# Patient Record
Sex: FEMALE | Employment: FULL TIME | URBAN - METROPOLITAN AREA
[De-identification: names, ages, dates, MRNs, and addresses within clinical notes are randomized per-mention and may not be internally consistent; named-entity substitution may affect disease eponyms.]

---

## 2017-02-14 ENCOUNTER — MED REC SCAN ONLY (OUTPATIENT)
Dept: INTERNAL MEDICINE CLINIC | Facility: CLINIC | Age: 57
End: 2017-02-14

## 2017-11-06 RX ORDER — DEXLANSOPRAZOLE 30 MG/1
30 CAPSULE, DELAYED RELEASE ORAL DAILY
Qty: 30 CAPSULE | Refills: 6 | Status: SHIPPED | OUTPATIENT
Start: 2017-11-06 | End: 2019-10-28

## 2019-09-30 ENCOUNTER — HOSPITAL (OUTPATIENT)
Dept: OTHER | Age: 59
End: 2019-09-30
Attending: INTERNAL MEDICINE

## 2019-10-11 ENCOUNTER — TELEPHONE (OUTPATIENT)
Dept: INTERNAL MEDICINE CLINIC | Facility: CLINIC | Age: 59
End: 2019-10-11

## 2019-10-11 NOTE — TELEPHONE ENCOUNTER
Patient called to see if Dr. Ana Diego received a letter from the [address] at [facility]?     Patient needs to have a diagnostic ultrasound of her right breast.     She is wondering if Dr. Ana Diego needs to see her before the test can be s

## 2019-10-28 ENCOUNTER — OFFICE VISIT (OUTPATIENT)
Dept: INTERNAL MEDICINE CLINIC | Facility: CLINIC | Age: 59
End: 2019-10-28
Payer: COMMERCIAL

## 2019-10-28 VITALS
WEIGHT: 174.38 LBS | DIASTOLIC BLOOD PRESSURE: 78 MMHG | OXYGEN SATURATION: 98 % | HEIGHT: 62 IN | HEART RATE: 87 BPM | SYSTOLIC BLOOD PRESSURE: 128 MMHG | BODY MASS INDEX: 32.09 KG/M2

## 2019-10-28 DIAGNOSIS — N60.12 FIBROCYSTIC BREAST CHANGES OF BOTH BREASTS: Primary | ICD-10-CM

## 2019-10-28 DIAGNOSIS — N60.11 FIBROCYSTIC BREAST CHANGES OF BOTH BREASTS: Primary | ICD-10-CM

## 2019-10-28 PROCEDURE — 99202 OFFICE O/P NEW SF 15 MIN: CPT | Performed by: INTERNAL MEDICINE

## 2019-10-28 NOTE — PROGRESS NOTES
HPI:    Patient ID: Mary Willis is a 63year old female. HPI Pt here for fu on need for a diagnostic mammogram- had a hx of slight anomaly in right breast  Which needs followup. No family hx of breast ca.     Review of Systems   HENT:        Viral s orders of the defined types were placed in this encounter.       Meds This Visit:  Requested Prescriptions      No prescriptions requested or ordered in this encounter       Imaging & Referrals:  None       #[id number]

## 2019-11-05 ENCOUNTER — HOSPITAL (OUTPATIENT)
Dept: OTHER | Age: 59
End: 2019-11-05
Attending: INTERNAL MEDICINE

## 2020-09-14 ENCOUNTER — TELEPHONE (OUTPATIENT)
Dept: SCHEDULING | Age: 60
End: 2020-09-14

## 2020-09-30 ENCOUNTER — HOSPITAL (OUTPATIENT)
Dept: OTHER | Age: 60
End: 2020-09-30
Attending: INTERNAL MEDICINE

## 2021-02-25 ENCOUNTER — EMPLOYEE HEALTH (OUTPATIENT)
Dept: OTHER | Age: 61
End: 2021-02-25

## 2021-02-25 DIAGNOSIS — Z11.59 SCREENING FOR VIRAL DISEASE: Primary | ICD-10-CM

## 2021-02-26 ENCOUNTER — LAB SERVICES (OUTPATIENT)
Dept: LAB | Age: 61
End: 2021-02-26

## 2021-02-26 DIAGNOSIS — Z11.59 SCREENING FOR VIRAL DISEASE: ICD-10-CM

## 2021-02-26 LAB — SARS-COV-2 AG RESP QL IA.RAPID: NEGATIVE

## 2021-02-26 PROCEDURE — 87426 SARSCOV CORONAVIRUS AG IA: CPT | Performed by: HOSPITALIST

## 2021-03-01 ENCOUNTER — IMMUNIZATION (OUTPATIENT)
Dept: LAB | Age: 61
End: 2021-03-01

## 2021-03-01 DIAGNOSIS — Z23 NEED FOR VACCINATION: Primary | ICD-10-CM

## 2021-03-01 PROCEDURE — 0011A COVID-19 MODERNA VACCINE: CPT

## 2021-03-01 PROCEDURE — 91301 COVID-19 MODERNA VACCINE: CPT

## 2021-03-04 ENCOUNTER — EMPLOYEE HEALTH (OUTPATIENT)
Dept: OTHER | Age: 61
End: 2021-03-04

## 2021-03-31 ENCOUNTER — IMMUNIZATION (OUTPATIENT)
Dept: LAB | Age: 61
End: 2021-03-31
Attending: HOSPITALIST

## 2021-03-31 DIAGNOSIS — Z23 NEED FOR VACCINATION: Primary | ICD-10-CM

## 2021-03-31 PROCEDURE — 0012A COVID-19 MODERNA VACCINE: CPT

## 2021-03-31 PROCEDURE — 91301 COVID-19 MODERNA VACCINE: CPT

## 2021-04-09 ENCOUNTER — EMPLOYEE HEALTH (OUTPATIENT)
Dept: OTHER | Age: 61
End: 2021-04-09

## 2021-04-09 DIAGNOSIS — Z20.822 SUSPECTED COVID-19 VIRUS INFECTION: Primary | ICD-10-CM

## 2021-04-10 ENCOUNTER — EMPLOYEE HEALTH (OUTPATIENT)
Dept: OTHER | Age: 61
End: 2021-04-10

## 2021-04-11 ENCOUNTER — LAB SERVICES (OUTPATIENT)
Dept: LAB | Age: 61
End: 2021-04-11

## 2021-04-11 DIAGNOSIS — Z20.822 SUSPECTED COVID-19 VIRUS INFECTION: ICD-10-CM

## 2021-04-11 LAB
SARS-COV-2 RNA RESP QL NAA+PROBE: NOT DETECTED
SERVICE CMNT-IMP: NORMAL
SERVICE CMNT-IMP: NORMAL

## 2021-04-11 PROCEDURE — U0003 INFECTIOUS AGENT DETECTION BY NUCLEIC ACID (DNA OR RNA); SEVERE ACUTE RESPIRATORY SYNDROME CORONAVIRUS 2 (SARS-COV-2) (CORONAVIRUS DISEASE [COVID-19]), AMPLIFIED PROBE TECHNIQUE, MAKING USE OF HIGH THROUGHPUT TECHNOLOGIES AS DESCRIBED BY CMS-2020-01-R: HCPCS | Performed by: HOSPITALIST

## 2021-04-11 PROCEDURE — U0005 INFEC AGEN DETEC AMPLI PROBE: HCPCS | Performed by: HOSPITALIST

## 2021-04-12 ENCOUNTER — EMPLOYEE HEALTH (OUTPATIENT)
Dept: OTHER | Age: 61
End: 2021-04-12

## 2021-04-12 DIAGNOSIS — Z11.59 SCREENING FOR VIRAL DISEASE: Primary | ICD-10-CM

## 2021-04-16 ENCOUNTER — LAB SERVICES (OUTPATIENT)
Dept: LAB | Age: 61
End: 2021-04-16

## 2021-04-16 DIAGNOSIS — Z11.59 SCREENING FOR VIRAL DISEASE: ICD-10-CM

## 2021-04-16 PROCEDURE — U0003 INFECTIOUS AGENT DETECTION BY NUCLEIC ACID (DNA OR RNA); SEVERE ACUTE RESPIRATORY SYNDROME CORONAVIRUS 2 (SARS-COV-2) (CORONAVIRUS DISEASE [COVID-19]), AMPLIFIED PROBE TECHNIQUE, MAKING USE OF HIGH THROUGHPUT TECHNOLOGIES AS DESCRIBED BY CMS-2020-01-R: HCPCS | Performed by: HOSPITALIST

## 2021-04-16 PROCEDURE — U0005 INFEC AGEN DETEC AMPLI PROBE: HCPCS | Performed by: HOSPITALIST

## 2021-04-17 ENCOUNTER — EMPLOYEE HEALTH (OUTPATIENT)
Dept: OTHER | Age: 61
End: 2021-04-17

## 2021-04-17 LAB
SARS-COV-2 RNA RESP QL NAA+PROBE: NOT DETECTED
SERVICE CMNT-IMP: NORMAL
SERVICE CMNT-IMP: NORMAL

## 2021-06-30 RX ORDER — LISINOPRIL 5 MG/1
TABLET ORAL
Qty: 30 TABLET | Refills: 5 | Status: SHIPPED | OUTPATIENT
Start: 2021-06-30 | End: 2022-03-11 | Stop reason: SDUPTHER

## 2021-12-11 ENCOUNTER — HOSPITAL ENCOUNTER (OUTPATIENT)
Dept: MAMMOGRAPHY | Age: 61
Discharge: HOME | End: 2021-12-11
Attending: INTERNAL MEDICINE

## 2021-12-11 DIAGNOSIS — Z12.31 VISIT FOR SCREENING MAMMOGRAM: ICD-10-CM

## 2021-12-11 PROCEDURE — 77063 BREAST TOMOSYNTHESIS BI: CPT

## 2021-12-15 ENCOUNTER — TELEPHONE (OUTPATIENT)
Dept: INTERNAL MEDICINE CLINIC | Facility: CLINIC | Age: 61
End: 2021-12-15

## 2021-12-15 NOTE — TELEPHONE ENCOUNTER
Patient called had mammogram done on Saturday, 12/11, at [facility] for [address] [place] [address]. Needing new order for additional scans.

## 2021-12-30 ENCOUNTER — TELEPHONE (OUTPATIENT)
Dept: INTERNAL MEDICINE CLINIC | Facility: CLINIC | Age: 61
End: 2021-12-30

## 2021-12-30 ENCOUNTER — HOSPITAL ENCOUNTER (OUTPATIENT)
Dept: MAMMOGRAPHY | Age: 61
Discharge: HOME | End: 2021-12-30
Attending: INTERNAL MEDICINE

## 2021-12-30 DIAGNOSIS — R92.8 ABNORMAL MAMMOGRAM: ICD-10-CM

## 2021-12-30 PROCEDURE — G0279 TOMOSYNTHESIS, MAMMO: HCPCS

## 2021-12-30 NOTE — TELEPHONE ENCOUNTER
Had dx mammogram today. Area that needs biopsy. Requesting an order be faxed to them. Patient aware and will schedule biopsy at her convenience. Rt stereotactic biopsy,  Need order.     Dr. Salazar Hall, Radiologist at [facility]   Fax to:  [phone]

## 2022-01-18 ENCOUNTER — HOSPITAL ENCOUNTER (OUTPATIENT)
Dept: MAMMOGRAPHY | Age: 62
Discharge: HOME | End: 2022-01-18
Attending: INTERNAL MEDICINE

## 2022-01-18 DIAGNOSIS — R92.8 ABNORMAL MAMMOGRAM: ICD-10-CM

## 2022-01-18 PROCEDURE — 88305 TISSUE EXAM BY PATHOLOGIST: CPT

## 2022-01-18 PROCEDURE — 10002801 HB RX 250 W/O HCPCS

## 2022-01-18 PROCEDURE — 10006023 HB SUPPLY 272

## 2022-01-18 PROCEDURE — 19081 BX BREAST 1ST LESION STRTCTC: CPT

## 2022-01-18 PROCEDURE — A4648 IMPLANTABLE TISSUE MARKER: HCPCS

## 2022-01-18 PROCEDURE — 10006027 HB SUPPLY 278

## 2022-01-18 RX ORDER — LIDOCAINE HYDROCHLORIDE 10 MG/ML
5 INJECTION, SOLUTION INFILTRATION; PERINEURAL ONCE
Status: COMPLETED | OUTPATIENT
Start: 2022-01-18 | End: 2022-01-18

## 2022-01-18 RX ORDER — LIDOCAINE HYDROCHLORIDE AND EPINEPHRINE 10; 10 MG/ML; UG/ML
10 INJECTION, SOLUTION INFILTRATION; PERINEURAL ONCE
Status: COMPLETED | OUTPATIENT
Start: 2022-01-18 | End: 2022-01-18

## 2022-01-18 RX ADMIN — LIDOCAINE HYDROCHLORIDE,EPINEPHRINE BITARTRATE 10 ML: 10; .01 INJECTION, SOLUTION INFILTRATION; PERINEURAL at 12:23

## 2022-01-18 RX ADMIN — LIDOCAINE HYDROCHLORIDE 50 MG: 10 INJECTION, SOLUTION INFILTRATION; PERINEURAL at 12:22

## 2022-01-19 LAB
ASR DISCLAIMER: NORMAL
CASE RPRT: NORMAL
CLINICAL INFO: NORMAL
PATH REPORT.FINAL DX SPEC: NORMAL
PATH REPORT.GROSS SPEC: NORMAL

## 2022-03-11 RX ORDER — LISINOPRIL 5 MG/1
5 TABLET ORAL AT BEDTIME
Qty: 30 TABLET | Refills: 12 | Status: SHIPPED | OUTPATIENT
Start: 2022-03-11

## 2022-05-16 ENCOUNTER — OFFICE VISIT (OUTPATIENT)
Dept: INTERNAL MEDICINE CLINIC | Facility: CLINIC | Age: 62
End: 2022-05-16
Payer: COMMERCIAL

## 2022-05-16 VITALS
HEART RATE: 87 BPM | HEIGHT: 62 IN | WEIGHT: 180 LBS | DIASTOLIC BLOOD PRESSURE: 80 MMHG | BODY MASS INDEX: 33.13 KG/M2 | OXYGEN SATURATION: 98 % | SYSTOLIC BLOOD PRESSURE: 148 MMHG

## 2022-05-16 DIAGNOSIS — Z00.00 WELLNESS EXAMINATION: Primary | ICD-10-CM

## 2022-05-16 DIAGNOSIS — G89.29 CHRONIC PAIN OF LEFT KNEE: ICD-10-CM

## 2022-05-16 DIAGNOSIS — Z87.898 HISTORY OF PREDIABETES: ICD-10-CM

## 2022-05-16 DIAGNOSIS — M25.562 CHRONIC PAIN OF LEFT KNEE: ICD-10-CM

## 2022-05-16 DIAGNOSIS — R10.13 DYSPEPSIA: ICD-10-CM

## 2022-05-16 DIAGNOSIS — N39.3 STRESS INCONTINENCE: ICD-10-CM

## 2022-05-16 DIAGNOSIS — I10 ESSENTIAL HYPERTENSION: ICD-10-CM

## 2022-05-16 PROCEDURE — 3079F DIAST BP 80-89 MM HG: CPT | Performed by: INTERNAL MEDICINE

## 2022-05-16 PROCEDURE — 88175 CYTOPATH C/V AUTO FLUID REDO: CPT | Performed by: INTERNAL MEDICINE

## 2022-05-16 PROCEDURE — 99396 PREV VISIT EST AGE 40-64: CPT | Performed by: INTERNAL MEDICINE

## 2022-05-16 PROCEDURE — 3008F BODY MASS INDEX DOCD: CPT | Performed by: INTERNAL MEDICINE

## 2022-05-16 PROCEDURE — 3077F SYST BP >= 140 MM HG: CPT | Performed by: INTERNAL MEDICINE

## 2022-05-16 RX ORDER — METFORMIN HYDROCHLORIDE 500 MG/1
500 TABLET, EXTENDED RELEASE ORAL
Qty: 90 TABLET | Refills: 3 | Status: SHIPPED | OUTPATIENT
Start: 2022-05-16

## 2022-05-16 RX ORDER — LISINOPRIL 2.5 MG/1
2.5 TABLET ORAL DAILY
Qty: 90 TABLET | Refills: 3 | Status: SHIPPED | OUTPATIENT
Start: 2022-05-16

## 2022-09-15 ENCOUNTER — TELEPHONE (OUTPATIENT)
Dept: INTERNAL MEDICINE CLINIC | Facility: CLINIC | Age: 62
End: 2022-09-15

## 2022-09-15 NOTE — TELEPHONE ENCOUNTER
Left message for pt to callback.    Patient left a message on nurse line. She would like an order for a diagnostic mammogram to be faxed to Monica Geller at [phone].

## 2022-10-21 ENCOUNTER — HOSPITAL ENCOUNTER (OUTPATIENT)
Dept: MAMMOGRAPHY | Age: 62
Discharge: HOME | End: 2022-10-21
Attending: INTERNAL MEDICINE

## 2022-10-21 DIAGNOSIS — Z98.890 STATUS POST RIGHT BREAST BIOPSY: ICD-10-CM

## 2022-10-21 PROCEDURE — G0279 TOMOSYNTHESIS, MAMMO: HCPCS

## 2023-03-24 DIAGNOSIS — I10 ESSENTIAL HYPERTENSION: ICD-10-CM

## 2023-03-24 DIAGNOSIS — G89.29 CHRONIC PAIN OF LEFT KNEE: ICD-10-CM

## 2023-03-24 DIAGNOSIS — M25.562 CHRONIC PAIN OF LEFT KNEE: ICD-10-CM

## 2023-03-24 RX ORDER — LISINOPRIL 2.5 MG/1
TABLET ORAL
Qty: 30 TABLET | Refills: 8 | Status: SHIPPED | OUTPATIENT
Start: 2023-03-24

## 2023-06-02 ENCOUNTER — OFFICE VISIT (OUTPATIENT)
Dept: INTERNAL MEDICINE CLINIC | Facility: CLINIC | Age: 63
End: 2023-06-02
Payer: COMMERCIAL

## 2023-06-02 VITALS
HEIGHT: 62 IN | BODY MASS INDEX: 33.13 KG/M2 | WEIGHT: 180 LBS | SYSTOLIC BLOOD PRESSURE: 120 MMHG | DIASTOLIC BLOOD PRESSURE: 70 MMHG

## 2023-06-02 DIAGNOSIS — N95.1 MENOPAUSAL SYNDROME: ICD-10-CM

## 2023-06-02 DIAGNOSIS — Z00.00 WELLNESS EXAMINATION: Primary | ICD-10-CM

## 2023-06-02 DIAGNOSIS — Z12.31 SCREENING MAMMOGRAM, ENCOUNTER FOR: ICD-10-CM

## 2023-06-02 DIAGNOSIS — G89.29 CHRONIC PAIN OF LEFT KNEE: ICD-10-CM

## 2023-06-02 DIAGNOSIS — I10 ESSENTIAL HYPERTENSION: ICD-10-CM

## 2023-06-02 DIAGNOSIS — R10.13 DYSPEPSIA: ICD-10-CM

## 2023-06-02 DIAGNOSIS — M25.562 CHRONIC PAIN OF LEFT KNEE: ICD-10-CM

## 2023-06-02 DIAGNOSIS — Z87.898 HISTORY OF PREDIABETES: ICD-10-CM

## 2023-06-02 PROCEDURE — 3008F BODY MASS INDEX DOCD: CPT | Performed by: INTERNAL MEDICINE

## 2023-06-02 PROCEDURE — 3078F DIAST BP <80 MM HG: CPT | Performed by: INTERNAL MEDICINE

## 2023-06-02 PROCEDURE — 3074F SYST BP LT 130 MM HG: CPT | Performed by: INTERNAL MEDICINE

## 2023-06-02 PROCEDURE — 99396 PREV VISIT EST AGE 40-64: CPT | Performed by: INTERNAL MEDICINE

## 2023-06-02 RX ORDER — LISINOPRIL 2.5 MG/1
2.5 TABLET ORAL DAILY
Qty: 30 TABLET | Refills: 11 | Status: SHIPPED | OUTPATIENT
Start: 2023-06-02

## 2023-06-02 RX ORDER — METFORMIN HYDROCHLORIDE 500 MG/1
500 TABLET, EXTENDED RELEASE ORAL
Qty: 30 TABLET | Refills: 0 | Status: SHIPPED | OUTPATIENT
Start: 2023-06-02

## 2023-06-07 DIAGNOSIS — R10.13 DYSPEPSIA: Primary | ICD-10-CM

## 2023-06-15 ENCOUNTER — HOSPITAL ENCOUNTER (OUTPATIENT)
Dept: ULTRASOUND IMAGING | Age: 63
Discharge: HOME | End: 2023-06-15
Attending: INTERNAL MEDICINE

## 2023-06-15 ENCOUNTER — LAB ENCOUNTER (OUTPATIENT)
Dept: LAB | Age: 63
End: 2023-06-15
Attending: INTERNAL MEDICINE
Payer: COMMERCIAL

## 2023-06-15 DIAGNOSIS — Z00.00 WELLNESS EXAMINATION: ICD-10-CM

## 2023-06-15 DIAGNOSIS — R10.13 DYSPEPSIA: ICD-10-CM

## 2023-06-15 DIAGNOSIS — Z87.898 HISTORY OF PREDIABETES: ICD-10-CM

## 2023-06-15 LAB
ALBUMIN SERPL-MCNC: 3.8 G/DL (ref 3.4–5)
ALBUMIN/GLOB SERPL: 0.9 {RATIO} (ref 1–2)
ALP LIVER SERPL-CCNC: 109 U/L
ALT SERPL-CCNC: 95 U/L
ANION GAP SERPL CALC-SCNC: 9 MMOL/L (ref 0–18)
AST SERPL-CCNC: 105 U/L (ref 15–37)
BASOPHILS # BLD AUTO: 0.07 X10(3) UL (ref 0–0.2)
BASOPHILS NFR BLD AUTO: 0.5 %
BILIRUB SERPL-MCNC: 0.5 MG/DL (ref 0.1–2)
BUN BLD-MCNC: 17 MG/DL (ref 7–18)
BUN/CREAT SERPL: 20.5 (ref 10–20)
CALCIUM BLD-MCNC: 9.4 MG/DL (ref 8.5–10.1)
CHLORIDE SERPL-SCNC: 102 MMOL/L (ref 98–112)
CHOLEST SERPL-MCNC: 203 MG/DL (ref ?–200)
CO2 SERPL-SCNC: 25 MMOL/L (ref 21–32)
CREAT BLD-MCNC: 0.83 MG/DL
DEPRECATED RDW RBC AUTO: 47.7 FL (ref 35.1–46.3)
EOSINOPHIL # BLD AUTO: 0.14 X10(3) UL (ref 0–0.7)
EOSINOPHIL NFR BLD AUTO: 1.1 %
ERYTHROCYTE [DISTWIDTH] IN BLOOD BY AUTOMATED COUNT: 13.6 % (ref 11–15)
EST. AVERAGE GLUCOSE BLD GHB EST-MCNC: 240 MG/DL (ref 68–126)
FASTING PATIENT LIPID ANSWER: YES
FASTING STATUS PATIENT QL REPORTED: YES
GFR SERPLBLD BASED ON 1.73 SQ M-ARVRAT: 80 ML/MIN/1.73M2 (ref 60–?)
GLOBULIN PLAS-MCNC: 4.4 G/DL (ref 2.8–4.4)
GLUCOSE BLD-MCNC: 249 MG/DL (ref 70–99)
HBA1C MFR BLD: 10 % (ref ?–5.7)
HCT VFR BLD AUTO: 43.8 %
HDLC SERPL-MCNC: 39 MG/DL (ref 40–59)
HGB BLD-MCNC: 14.2 G/DL
IMM GRANULOCYTES # BLD AUTO: 0.06 X10(3) UL (ref 0–1)
IMM GRANULOCYTES NFR BLD: 0.5 %
LDLC SERPL CALC-MCNC: 138 MG/DL (ref ?–100)
LYMPHOCYTES # BLD AUTO: 4.09 X10(3) UL (ref 1–4)
LYMPHOCYTES NFR BLD AUTO: 31 %
MCH RBC QN AUTO: 30.7 PG (ref 26–34)
MCHC RBC AUTO-ENTMCNC: 32.4 G/DL (ref 31–37)
MCV RBC AUTO: 94.8 FL
MONOCYTES # BLD AUTO: 0.92 X10(3) UL (ref 0.1–1)
MONOCYTES NFR BLD AUTO: 7 %
NEUTROPHILS # BLD AUTO: 7.91 X10 (3) UL (ref 1.5–7.7)
NEUTROPHILS # BLD AUTO: 7.91 X10(3) UL (ref 1.5–7.7)
NEUTROPHILS NFR BLD AUTO: 59.9 %
NONHDLC SERPL-MCNC: 164 MG/DL (ref ?–130)
OSMOLALITY SERPL CALC.SUM OF ELEC: 292 MOSM/KG (ref 275–295)
PLATELET # BLD AUTO: 224 10(3)UL (ref 150–450)
POTASSIUM SERPL-SCNC: 4.5 MMOL/L (ref 3.5–5.1)
PROT SERPL-MCNC: 8.2 G/DL (ref 6.4–8.2)
RBC # BLD AUTO: 4.62 X10(6)UL
SODIUM SERPL-SCNC: 136 MMOL/L (ref 136–145)
T4 FREE SERPL-MCNC: 1.3 NG/DL (ref 0.8–1.7)
TRIGL SERPL-MCNC: 145 MG/DL (ref 30–149)
TSI SER-ACNC: 1.94 MIU/ML (ref 0.36–3.74)
VLDLC SERPL CALC-MCNC: 27 MG/DL (ref 0–30)
WBC # BLD AUTO: 13.2 X10(3) UL (ref 4–11)

## 2023-06-15 PROCEDURE — 84443 ASSAY THYROID STIM HORMONE: CPT

## 2023-06-15 PROCEDURE — 83036 HEMOGLOBIN GLYCOSYLATED A1C: CPT

## 2023-06-15 PROCEDURE — 84439 ASSAY OF FREE THYROXINE: CPT

## 2023-06-15 PROCEDURE — 36415 COLL VENOUS BLD VENIPUNCTURE: CPT

## 2023-06-15 PROCEDURE — 85025 COMPLETE CBC W/AUTO DIFF WBC: CPT

## 2023-06-15 PROCEDURE — 80053 COMPREHEN METABOLIC PANEL: CPT

## 2023-06-15 PROCEDURE — 3046F HEMOGLOBIN A1C LEVEL >9.0%: CPT | Performed by: INTERNAL MEDICINE

## 2023-06-15 PROCEDURE — 76700 US EXAM ABDOM COMPLETE: CPT

## 2023-06-15 PROCEDURE — 80061 LIPID PANEL: CPT

## 2023-06-16 ENCOUNTER — TELEPHONE (OUTPATIENT)
Dept: INTERNAL MEDICINE CLINIC | Facility: CLINIC | Age: 63
End: 2023-06-16

## 2023-06-16 NOTE — TELEPHONE ENCOUNTER
Call back to patient per Triage Line phone message. Left message advising that US abdomen is ordered and gave phone number to call to schedule.

## 2023-08-11 ENCOUNTER — OFFICE VISIT (OUTPATIENT)
Dept: INTERNAL MEDICINE CLINIC | Facility: CLINIC | Age: 63
End: 2023-08-11
Payer: COMMERCIAL

## 2023-08-11 VITALS
TEMPERATURE: 98 F | HEART RATE: 72 BPM | OXYGEN SATURATION: 97 % | BODY MASS INDEX: 32.94 KG/M2 | DIASTOLIC BLOOD PRESSURE: 70 MMHG | WEIGHT: 179 LBS | SYSTOLIC BLOOD PRESSURE: 110 MMHG | HEIGHT: 62 IN

## 2023-08-11 DIAGNOSIS — E55.9 VITAMIN D DEFICIENCY: ICD-10-CM

## 2023-08-11 DIAGNOSIS — E11.69 DIABETES MELLITUS TYPE 2 IN OBESE: Primary | ICD-10-CM

## 2023-08-11 DIAGNOSIS — E66.9 DIABETES MELLITUS TYPE 2 IN OBESE: Primary | ICD-10-CM

## 2023-08-11 DIAGNOSIS — K76.0 FATTY LIVER: ICD-10-CM

## 2023-08-11 LAB
CARTRIDGE LOT#: 603 NUMERIC
HEMOGLOBIN A1C: 8.5 % (ref 4.3–5.6)

## 2023-08-11 PROCEDURE — 3078F DIAST BP <80 MM HG: CPT | Performed by: INTERNAL MEDICINE

## 2023-08-11 PROCEDURE — 99214 OFFICE O/P EST MOD 30 MIN: CPT | Performed by: INTERNAL MEDICINE

## 2023-08-11 PROCEDURE — 3074F SYST BP LT 130 MM HG: CPT | Performed by: INTERNAL MEDICINE

## 2023-08-11 PROCEDURE — 83036 HEMOGLOBIN GLYCOSYLATED A1C: CPT | Performed by: INTERNAL MEDICINE

## 2023-08-11 PROCEDURE — 3008F BODY MASS INDEX DOCD: CPT | Performed by: INTERNAL MEDICINE

## 2023-09-21 ENCOUNTER — TELEPHONE (OUTPATIENT)
Dept: GASTROENTEROLOGY | Facility: CLINIC | Age: 63
End: 2023-09-21

## 2023-09-21 ENCOUNTER — OFFICE VISIT (OUTPATIENT)
Dept: GASTROENTEROLOGY | Facility: CLINIC | Age: 63
End: 2023-09-21

## 2023-09-21 VITALS
HEIGHT: 62 IN | SYSTOLIC BLOOD PRESSURE: 118 MMHG | DIASTOLIC BLOOD PRESSURE: 70 MMHG | BODY MASS INDEX: 31.65 KG/M2 | WEIGHT: 172 LBS

## 2023-09-21 DIAGNOSIS — R68.81 EARLY SATIETY: ICD-10-CM

## 2023-09-21 DIAGNOSIS — R74.8 ELEVATED LIVER ENZYMES: ICD-10-CM

## 2023-09-21 DIAGNOSIS — R73.09 ELEVATED HEMOGLOBIN A1C: ICD-10-CM

## 2023-09-21 DIAGNOSIS — M54.9 PAIN RADIATING TO BACK: ICD-10-CM

## 2023-09-21 DIAGNOSIS — Z12.12 SCREENING FOR COLORECTAL CANCER: ICD-10-CM

## 2023-09-21 DIAGNOSIS — R10.11 RIGHT UPPER QUADRANT ABDOMINAL PAIN: ICD-10-CM

## 2023-09-21 DIAGNOSIS — R93.5 ABNORMAL ULTRASOUND OF ABDOMEN: ICD-10-CM

## 2023-09-21 DIAGNOSIS — Z12.11 SCREEN FOR COLON CANCER: ICD-10-CM

## 2023-09-21 DIAGNOSIS — R16.0 HEPATOMEGALY: ICD-10-CM

## 2023-09-21 DIAGNOSIS — K76.0 FATTY LIVER: ICD-10-CM

## 2023-09-21 DIAGNOSIS — Z12.11 SCREENING FOR COLORECTAL CANCER: ICD-10-CM

## 2023-09-21 DIAGNOSIS — R10.13 EPIGASTRIC PAIN: Primary | ICD-10-CM

## 2023-09-21 DIAGNOSIS — R63.4 WEIGHT LOSS: ICD-10-CM

## 2023-09-21 PROCEDURE — 3078F DIAST BP <80 MM HG: CPT | Performed by: INTERNAL MEDICINE

## 2023-09-21 PROCEDURE — 3074F SYST BP LT 130 MM HG: CPT | Performed by: INTERNAL MEDICINE

## 2023-09-21 PROCEDURE — 3008F BODY MASS INDEX DOCD: CPT | Performed by: INTERNAL MEDICINE

## 2023-09-21 PROCEDURE — 99204 OFFICE O/P NEW MOD 45 MIN: CPT | Performed by: INTERNAL MEDICINE

## 2023-09-21 NOTE — TELEPHONE ENCOUNTER
Scheduled for:  Colonoscopy 42087/94393 & EGD 14108  Provider Name:  Dr. Glen Singleton  Date:  1/17/2024  Location:  [facility]  Sedation:  MAC  Time:  10:15 am, (pt is aware that [facility] will call the day before to confirm arrival time)  Prep: Golytely    Meds/Allergies Reconciled?:  Physician reviewed  Diagnosis with codes:  Epigastric pain R10.13; Screening for colon cancer Z12.11  Was patient informed to call insurance with codes (Y/N):  Yes  Referral sent?:  Referral was sent at the time of electronic surgical scheduling. [address] [facility] or [address] notified?:  I sent an electronic request to Endo Scheduling and received a confirmation today. Medication Orders:  Hold metformin day before and day of the procedure  Hold semaglutide 1 week prior  Pt is aware to NOT take iron pills, herbal meds and diet supplements for 7 days before exam. Also to NOT take any form of alcohol, recreational drugs and any forms of ED meds 24 hours before exam.   Misc Orders:  N/A     Further instructions given by staff:  Prep instructions were given to pt in the office, pt verbalized understanding.

## 2023-09-21 NOTE — PATIENT INSTRUCTIONS
1. Schedule colonoscopy and EGD with MAC at [facility] [dx: epigastric pain, colorectal cancer screening]    2.  bowel prep from pharmacy (split trilyte or golytely). As we discussed it is important to take the bowel preparation in two parts taking 2L of the liquid the night before the procedure and the second 2L the morning of the procedure starting approximately 6 hours prior to your scheduled procedure time. 3. Medication    Hold metformin day before and day of the procedure  Hold semaglutide 1 week prior  Otherwise, continue all medications for procedure    4. Read all bowel prep instructions carefully    5. AVOID seeds, nuts, popcorn, raw fruits and vegetables (cooked is okay) for 2-3 days before procedure    >>>Please note: if you were prescribed a bowel prep and it is too expensive or not covered by insurance, it is okay to substitute Trilyte or Golytely (or any similar generic prep). This can be done by notifying the pharmacy or calling our office. 6. Schedule your CT scan    7.  Take famotidine at bedtime
no

## 2023-09-22 ENCOUNTER — TELEPHONE (OUTPATIENT)
Facility: CLINIC | Age: 63
End: 2023-09-22

## 2023-09-22 DIAGNOSIS — R93.5 ABNORMAL ULTRASOUND OF ABDOMEN: ICD-10-CM

## 2023-09-22 DIAGNOSIS — Z12.11 SCREENING FOR COLORECTAL CANCER: ICD-10-CM

## 2023-09-22 DIAGNOSIS — R74.8 ELEVATED LIVER ENZYMES: ICD-10-CM

## 2023-09-22 DIAGNOSIS — R68.81 EARLY SATIETY: ICD-10-CM

## 2023-09-22 DIAGNOSIS — K76.0 FATTY LIVER: ICD-10-CM

## 2023-09-22 DIAGNOSIS — Z12.12 SCREENING FOR COLORECTAL CANCER: ICD-10-CM

## 2023-09-22 DIAGNOSIS — R10.11 RIGHT UPPER QUADRANT PAIN: ICD-10-CM

## 2023-09-22 DIAGNOSIS — R63.4 WEIGHT LOSS: ICD-10-CM

## 2023-09-22 DIAGNOSIS — R16.0 HEPATOMEGALY: ICD-10-CM

## 2023-09-22 DIAGNOSIS — R10.13 EPIGASTRIC PAIN: Primary | ICD-10-CM

## 2023-09-22 DIAGNOSIS — R73.09 ELEVATED HEMOGLOBIN A1C: ICD-10-CM

## 2023-09-22 DIAGNOSIS — M54.9 PAIN RADIATING TO BACK: ICD-10-CM

## 2023-09-22 NOTE — TELEPHONE ENCOUNTER
Dr. Abiola Brown,    [facility] asking for updated CT order to show CT abdomen and pelvis stat. Scheduled today. I pended an order below, please review and sign if appropriate, thank you.

## 2023-09-22 NOTE — TELEPHONE ENCOUNTER
Melissa, from [facility] calling to request new order for CT of abd to include pelvis per diagnosis, thanks. Scheduled today at 11:45 am, request to send as stat, thanks.

## 2023-09-26 ENCOUNTER — TELEPHONE (OUTPATIENT)
Dept: GASTROENTEROLOGY | Facility: CLINIC | Age: 63
End: 2023-09-26

## 2023-09-26 DIAGNOSIS — R10.9 ABDOMINAL PAIN, UNSPECIFIED ABDOMINAL LOCATION: ICD-10-CM

## 2023-09-26 DIAGNOSIS — R93.5 ABNORMAL CT SCAN, PELVIS: ICD-10-CM

## 2023-09-26 DIAGNOSIS — R19.00 PELVIC MASS: ICD-10-CM

## 2023-09-26 DIAGNOSIS — K82.4 GALLBLADDER POLYP: ICD-10-CM

## 2023-09-26 DIAGNOSIS — R93.2 ABNORMAL COMPUTED TOMOGRAPHY OF GALLBLADDER: Primary | ICD-10-CM

## 2023-09-26 DIAGNOSIS — R63.4 WEIGHT LOSS: ICD-10-CM

## 2023-09-26 NOTE — TELEPHONE ENCOUNTER
I called the patient and discussed her CT results. Discussed the uncertainty of the pelvic structure/mass noted on CT. She notes no symptoms there. Says hasn't had a gynecologist and usually gets pelvic exams/paps with her primary care physician. Discussed possibilities of ultrasound, MRI pelvis and/or possibility of gynecology evaluation. Will touch base with Dr. Jeffrey Rodriguez. Discussed the gallbladder is likely of non-significant concern but will order a non-urgent gallbladder ultrasound. Guillermo Watts - hoping you can take a look at this patient's CT and help me with directing her on evaluation/management of the pelvic mass. Thank you! Peg Quiroga    GI staff:    Please follow up on this in 1 week so we have recommendations for Ms. Orquidea Jameson.     Thanks    Yunior Ge MD  [address] - Gastroenterology  9/26/2023  3:53 PM

## 2023-09-27 NOTE — TELEPHONE ENCOUNTER
Dr. Michael Frank,    I spoke to patient she has Mri scheduled for Tuesday and ultrasound for Wednesday. She did want to note that she had an ultrasound done in June 2023 (in care everywhere) but will have this done.

## 2023-09-27 NOTE — TELEPHONE ENCOUNTER
Yes, I saw the prior ultrasound but doesn't mention anything about the gallbladder on that one.     Ok    Thanks    Yinka Couch MD  [address] - Gastroenterology  9/27/2023  2:50 PM

## 2023-09-27 NOTE — TELEPHONE ENCOUNTER
Thai Puentes,    Thank you for looking and responding. Ill order the MRI of the pelvis for her. Is there a gynecologist that you would typically refer to? Thanks again for your help!     Tamiko Chao    GI staff:    Please let the patient know I've ordered the MRI of her pelvis to schedule    Thanks    Jeff Rodarte MD  [address] - Gastroenterology  9/27/2023  10:37 AM

## 2023-10-04 ENCOUNTER — HOSPITAL ENCOUNTER (OUTPATIENT)
Dept: ULTRASOUND IMAGING | Age: 63
Discharge: HOME | End: 2023-10-04
Attending: INTERNAL MEDICINE
Payer: COMMERCIAL

## 2023-10-04 DIAGNOSIS — R93.2 ABNORMAL COMPUTED TOMOGRAPHY OF GALLBLADDER: ICD-10-CM

## 2023-10-04 DIAGNOSIS — K82.4 GALLBLADDER POLYP: ICD-10-CM

## 2023-10-04 PROCEDURE — 76705 ECHO EXAM OF ABDOMEN: CPT | Performed by: INTERNAL MEDICINE

## 2023-10-05 ENCOUNTER — TELEPHONE (OUTPATIENT)
Dept: GASTROENTEROLOGY | Facility: CLINIC | Age: 63
End: 2023-10-05

## 2023-10-05 NOTE — TELEPHONE ENCOUNTER
Dr. Lam Arreguin,    I spoke to patient and reviewed note below and provided information to Dr. Jose Raul Merida who she will follow up with. She did however have a concern about the report stating \"The mass abuts the adjacent rectum and a segment of ileum but appear to be separate from the bowel. \"    She is concerned if this will damage/effect her bowels at all if removed.

## 2023-10-05 NOTE — TELEPHONE ENCOUNTER
GI staff:    Please contact the patient and let her know the report from the radiologist of her MRI of the pelvis does not suggest concern that the mass in the pelvis is a tumor but is coming from the uterus. I do recommend gynecology evaluation with Dr. Pat Adrian. Dr. Malik Quiles would also recommend seeing Dr. Pat Adrian as well as I spoke with her today.     Thanks    Lillian Lindsey MD  [address] - Gastroenterology  10/5/2023  12:49 PM

## 2023-10-05 NOTE — TELEPHONE ENCOUNTER
I don't think there necessarily will be high/likely change that her bowels should be damaged with a surgery for this nor am I sure if she requires surgery but I believe she absolutely she can and should discuss all of this including any concerns she has at her gynecology appointment    Thanks    Mecca Saldaña MD  [address] [facility] Gastroenterology  10/5/2023  1:31 PM

## 2023-10-26 ENCOUNTER — OFFICE VISIT (OUTPATIENT)
Dept: OBGYN CLINIC | Facility: CLINIC | Age: 63
End: 2023-10-26

## 2023-10-26 VITALS
HEIGHT: 62 IN | BODY MASS INDEX: 30.73 KG/M2 | SYSTOLIC BLOOD PRESSURE: 115 MMHG | DIASTOLIC BLOOD PRESSURE: 77 MMHG | WEIGHT: 167 LBS | HEART RATE: 71 BPM

## 2023-10-26 DIAGNOSIS — N85.8 UTERINE MASS: Primary | ICD-10-CM

## 2023-10-26 PROCEDURE — 3008F BODY MASS INDEX DOCD: CPT | Performed by: OBSTETRICS & GYNECOLOGY

## 2023-10-26 PROCEDURE — 3074F SYST BP LT 130 MM HG: CPT | Performed by: OBSTETRICS & GYNECOLOGY

## 2023-10-26 PROCEDURE — 3078F DIAST BP <80 MM HG: CPT | Performed by: OBSTETRICS & GYNECOLOGY

## 2023-10-26 PROCEDURE — 99203 OFFICE O/P NEW LOW 30 MIN: CPT | Performed by: OBSTETRICS & GYNECOLOGY

## 2023-10-26 RX ORDER — POLYETHYLENE GLYCOL-3350 AND ELECTROLYTES 236; 6.74; 5.86; 2.97; 22.74 G/274.31G; G/274.31G; G/274.31G; G/274.31G; G/274.31G
POWDER, FOR SOLUTION ORAL
COMMUNITY
Start: 2023-10-12

## 2023-10-26 RX ORDER — SODIUM FLUORIDE 6 MG/ML
PASTE, DENTIFRICE DENTAL
COMMUNITY
Start: 2023-10-12

## 2023-11-09 ENCOUNTER — TELEPHONE (OUTPATIENT)
Facility: CLINIC | Age: 63
End: 2023-11-09

## 2023-11-09 DIAGNOSIS — Z12.11 SPECIAL SCREENING FOR MALIGNANT NEOPLASMS, COLON: Primary | ICD-10-CM

## 2023-11-09 DIAGNOSIS — R10.13 EPIGASTRIC PAIN: ICD-10-CM

## 2023-11-09 NOTE — TELEPHONE ENCOUNTER
Patient is calling states that her surgeon would like to know if there is an opening to have her cln sooner then what is scheduled

## 2023-11-10 NOTE — TELEPHONE ENCOUNTER
Can offer opening 11/22    Thanks    Yunior Ge MD  [address] [facility] Gastroenterology  11/10/2023  12:38 PM

## 2023-11-10 NOTE — TELEPHONE ENCOUNTER
Rescheduled for:  Colonoscopy 44349/[id number] & EGD 05664  Provider Name:  Dr. Mohini Lawson  Date: FROM  1/17/2024 To 11/22/2023  Location:  [facility]  Sedation:  MAC  Time:  FROM 10:15 am, TO 10am (pt is aware that [facility] will call the day before to confirm arrival time)  Prep: Golytely    Meds/Allergies Reconciled?:  Physician reviewed  Diagnosis with codes:  Epigastric pain R10.13; Screening for colon cancer Z12.11  Was patient informed to call insurance with codes (Y/N):  Yes  Referral sent?:  Referral was sent at the time of electronic surgical scheduling. [employer] or [facility] notified?:  I sent an electronic request to Endo Scheduling and received a confirmation today. Medication Orders:  Hold metformin day before and day of the procedure  Hold semaglutide 1 week prior  Pt is aware to NOT take iron pills, herbal meds and diet supplements for 7 days before exam. Also to NOT take any form of alcohol, recreational drugs and any forms of ED meds 24 hours before exam.   Misc Orders:  N/A     Further instructions given by staff:  Prep instructions were given to pt in the office, pt verbalized understanding.

## 2023-11-10 NOTE — TELEPHONE ENCOUNTER
PPD-    Patient's colonoscopy [id number] [phone] is being moved to 11/22/2023 at the [facility]. DX Colon cancer screen Z12.11, Epigastric pain R10.13. Thanks!

## 2023-11-10 NOTE — TELEPHONE ENCOUNTER
Dr. Alejandra Caballero-    I spoke with patient she states needs her procedure moved up due to mass in pelvis and will be having surgery soon. Please advise    Thanks!

## 2023-11-13 NOTE — TELEPHONE ENCOUNTER
GI staff,     I called [facility] at [phone], spoke with [place], no PA needed, referral updated.     Thank you,    Neelam Martinez

## 2023-11-17 ENCOUNTER — TELEPHONE (OUTPATIENT)
Facility: CLINIC | Age: 63
End: 2023-11-17

## 2023-11-17 NOTE — TELEPHONE ENCOUNTER
Dr. Peggy Jeans,    Office notes received from Dr. Brenda Pendleton on 11/8/23, placed on your desk for review.

## 2023-11-22 PROBLEM — Z12.11 ENCOUNTER FOR COLORECTAL CANCER SCREENING: Status: ACTIVE | Noted: 2023-11-22

## 2023-11-22 PROBLEM — Z12.12 ENCOUNTER FOR COLORECTAL CANCER SCREENING: Status: ACTIVE | Noted: 2023-11-22

## 2023-11-22 PROBLEM — K21.9 GASTROESOPHAGEAL REFLUX DISEASE: Status: ACTIVE | Noted: 2023-11-22

## 2023-11-28 DIAGNOSIS — Z12.31 SCREENING MAMMOGRAM FOR BREAST CANCER: Primary | ICD-10-CM

## 2023-11-29 ENCOUNTER — TELEPHONE (OUTPATIENT)
Facility: CLINIC | Age: 63
End: 2023-11-29

## 2023-11-29 ENCOUNTER — APPOINTMENT (OUTPATIENT)
Dept: CT IMAGING | Age: 63
End: 2023-11-29
Attending: INTERNAL MEDICINE

## 2023-11-29 NOTE — TELEPHONE ENCOUNTER
Recall colonoscopy in 7 years per Dr Marcel Albert.     Colon done 11/22/2023    Health maintenance updated and message sent to patient outreach to repeat colonoscopy in 7 years

## 2023-11-29 NOTE — TELEPHONE ENCOUNTER
----- Message from Lillian Lindsey MD sent at 11/28/2023  3:59 PM CST -----  GI staff: please place recall for colonoscopy in 7 years     Thanks    Lillian Lindsey MD  [address] [facility] Gastroenterology  11/28/2023  3:57 PM

## 2023-12-01 ENCOUNTER — HOSPITAL ENCOUNTER (OUTPATIENT)
Dept: CT IMAGING | Age: 63
Discharge: HOME | End: 2023-12-01
Attending: INTERNAL MEDICINE

## 2023-12-01 DIAGNOSIS — Z12.31 SCREENING MAMMOGRAM FOR BREAST CANCER: ICD-10-CM

## 2023-12-01 PROCEDURE — 77063 BREAST TOMOSYNTHESIS BI: CPT

## 2023-12-05 ENCOUNTER — MED REC SCAN ONLY (OUTPATIENT)
Dept: INTERNAL MEDICINE CLINIC | Facility: CLINIC | Age: 63
End: 2023-12-05

## 2024-01-03 ENCOUNTER — WALK IN (OUTPATIENT)
Dept: URGENT CARE | Age: 64
End: 2024-01-03

## 2024-01-03 VITALS
SYSTOLIC BLOOD PRESSURE: 138 MMHG | OXYGEN SATURATION: 97 % | HEIGHT: 62 IN | DIASTOLIC BLOOD PRESSURE: 78 MMHG | WEIGHT: 162 LBS | HEART RATE: 70 BPM | TEMPERATURE: 98.2 F | RESPIRATION RATE: 14 BRPM | BODY MASS INDEX: 29.81 KG/M2

## 2024-01-03 DIAGNOSIS — J02.9 PHARYNGITIS, UNSPECIFIED ETIOLOGY: Primary | ICD-10-CM

## 2024-01-03 DIAGNOSIS — K13.70 ORAL MUCOSAL LESION: ICD-10-CM

## 2024-01-03 LAB
FLUAV AG UPPER RESP QL IA.RAPID: NEGATIVE
FLUBV AG UPPER RESP QL IA.RAPID: NEGATIVE
INTERNAL PROCEDURAL CONTROLS ACCEPTABLE: YES
S PYO AG THROAT QL IA.RAPID: NEGATIVE
SARS-COV+SARS-COV-2 AG RESP QL IA.RAPID: NOT DETECTED
TEST LOT EXPIRATION DATE: NORMAL
TEST LOT EXPIRATION DATE: NORMAL
TEST LOT NUMBER: NORMAL
TEST LOT NUMBER: NORMAL

## 2024-01-03 PROCEDURE — 87428 SARSCOV & INF VIR A&B AG IA: CPT | Performed by: NURSE PRACTITIONER

## 2024-01-03 PROCEDURE — 99203 OFFICE O/P NEW LOW 30 MIN: CPT | Performed by: NURSE PRACTITIONER

## 2024-01-03 PROCEDURE — 87880 STREP A ASSAY W/OPTIC: CPT | Performed by: NURSE PRACTITIONER

## 2024-01-03 RX ORDER — ASPIRIN 325 MG
325 TABLET ORAL
COMMUNITY

## 2024-01-03 RX ORDER — METFORMIN HYDROCHLORIDE 500 MG/1
TABLET, EXTENDED RELEASE ORAL
COMMUNITY

## 2024-01-03 RX ORDER — SEMAGLUTIDE 0.68 MG/ML
INJECTION, SOLUTION SUBCUTANEOUS
COMMUNITY

## 2024-01-03 ASSESSMENT — ENCOUNTER SYMPTOMS
VOMITING: 0
EYE ITCHING: 0
FACIAL SWELLING: 0
ACTIVITY CHANGE: 0
VISUAL CHANGE: 0
FATIGUE: 1
SORE THROAT: 1
CONSTIPATION: 0
DIARRHEA: 0
HEADACHES: 0
TROUBLE SWALLOWING: 0
CHILLS: 0
APPETITE CHANGE: 0
SWOLLEN GLANDS: 0
SHORTNESS OF BREATH: 0
NAUSEA: 0
NUMBNESS: 0
DIAPHORESIS: 0
LIGHT-HEADEDNESS: 0
SINUS PRESSURE: 0
VERTIGO: 0
CHANGE IN BOWEL HABIT: 0
SINUS PAIN: 0
EYE DISCHARGE: 0
COLOR CHANGE: 0
FEVER: 0
COUGH: 0
RHINORRHEA: 0
PHOTOPHOBIA: 0
WHEEZING: 0
WEAKNESS: 0
CHEST TIGHTNESS: 0
EYE REDNESS: 0
SLEEP DISTURBANCE: 1
ABDOMINAL PAIN: 0
EYE PAIN: 0
ANOREXIA: 0
DIZZINESS: 0

## 2024-01-05 ENCOUNTER — HOSPITAL ENCOUNTER (OUTPATIENT)
Dept: CARDIOLOGY | Age: 64
End: 2024-01-05
Attending: OBSTETRICS & GYNECOLOGY

## 2024-01-05 ENCOUNTER — LAB REQUISITION (OUTPATIENT)
Dept: LAB | Age: 64
End: 2024-01-05

## 2024-01-05 ENCOUNTER — HOSPITAL ENCOUNTER (OUTPATIENT)
Dept: GENERAL RADIOLOGY | Age: 64
End: 2024-01-05
Attending: OBSTETRICS & GYNECOLOGY

## 2024-01-05 ENCOUNTER — LAB SERVICES (OUTPATIENT)
Dept: LAB | Age: 64
End: 2024-01-05

## 2024-01-05 DIAGNOSIS — N85.8 UTERINE MASS: ICD-10-CM

## 2024-01-05 DIAGNOSIS — Z01.818 PRE-OP TESTING: Primary | ICD-10-CM

## 2024-01-05 DIAGNOSIS — N85.8 OTHER SPECIFIED NONINFLAMMATORY DISORDERS OF UTERUS: Primary | ICD-10-CM

## 2024-01-05 DIAGNOSIS — Z01.818 PRE-OP TESTING: ICD-10-CM

## 2024-01-05 LAB
ALBUMIN SERPL-MCNC: 3.9 G/DL (ref 3.6–5.1)
ALBUMIN/GLOB SERPL: 1 {RATIO} (ref 1–2.4)
ALP SERPL-CCNC: 85 UNITS/L (ref 45–117)
ALT SERPL-CCNC: 41 UNITS/L
ANION GAP SERPL CALC-SCNC: 10 MMOL/L (ref 7–19)
APTT PPP: 33 SEC (ref 22–32)
AST SERPL-CCNC: 31 UNITS/L
ATRIAL RATE (BPM): 71
BASOPHILS # BLD: 0.1 K/MCL (ref 0–0.3)
BASOPHILS NFR BLD: 1 %
BILIRUB SERPL-MCNC: 0.6 MG/DL (ref 0.2–1)
BUN SERPL-MCNC: 15 MG/DL (ref 6–20)
BUN/CREAT SERPL: 25 (ref 7–25)
CALCIUM SERPL-MCNC: 9.7 MG/DL (ref 8.4–10.2)
CHLORIDE SERPL-SCNC: 106 MMOL/L (ref 97–110)
CO2 SERPL-SCNC: 23 MMOL/L (ref 21–32)
CREAT SERPL-MCNC: 0.59 MG/DL (ref 0.51–0.95)
DEPRECATED RDW RBC: 47 FL (ref 39–50)
EGFRCR SERPLBLD CKD-EPI 2021: >90 ML/MIN/{1.73_M2}
EOSINOPHIL # BLD: 0.2 K/MCL (ref 0–0.5)
EOSINOPHIL NFR BLD: 2 %
ERYTHROCYTE [DISTWIDTH] IN BLOOD: 13.2 % (ref 11–15)
FASTING DURATION TIME PATIENT: 19 HOURS (ref 0–999)
GLOBULIN SER-MCNC: 3.9 G/DL (ref 2–4)
GLUCOSE SERPL-MCNC: 95 MG/DL (ref 70–99)
HCT VFR BLD CALC: 42.9 % (ref 36–46.5)
HGB BLD-MCNC: 14 G/DL (ref 12–15.5)
IMM GRANULOCYTES # BLD AUTO: 0.1 K/MCL (ref 0–0.2)
IMM GRANULOCYTES # BLD: 1 %
INR PPP: 1
LYMPHOCYTES # BLD: 4.1 K/MCL (ref 1–4)
LYMPHOCYTES NFR BLD: 33 %
MCH RBC QN AUTO: 31.3 PG (ref 26–34)
MCHC RBC AUTO-ENTMCNC: 32.6 G/DL (ref 32–36.5)
MCV RBC AUTO: 96 FL (ref 78–100)
MONOCYTES # BLD: 0.8 K/MCL (ref 0.3–0.9)
MONOCYTES NFR BLD: 6 %
NEUTROPHILS # BLD: 7.3 K/MCL (ref 1.8–7.7)
NEUTROPHILS NFR BLD: 57 %
NRBC BLD MANUAL-RTO: 0 /100 WBC
P AXIS (DEGREES): 36
PLATELET # BLD AUTO: 241 K/MCL (ref 140–450)
POTASSIUM SERPL-SCNC: 3.9 MMOL/L (ref 3.4–5.1)
PR-INTERVAL (MSEC): 152
PROT SERPL-MCNC: 7.8 G/DL (ref 6.4–8.2)
PROTHROMBIN TIME: 10.8 SEC (ref 9.7–11.8)
QRS-INTERVAL (MSEC): 82
QT-INTERVAL (MSEC): 386
QTC: 419
R AXIS (DEGREES): 20
RBC # BLD: 4.47 MIL/MCL (ref 4–5.2)
REPORT TEXT: NORMAL
SODIUM SERPL-SCNC: 135 MMOL/L (ref 135–145)
T AXIS (DEGREES): 25
VENTRICULAR RATE EKG/MIN (BPM): 71
WBC # BLD: 12.6 K/MCL (ref 4.2–11)

## 2024-01-05 PROCEDURE — 85730 THROMBOPLASTIN TIME PARTIAL: CPT | Performed by: INTERNAL MEDICINE

## 2024-01-05 PROCEDURE — 71046 X-RAY EXAM CHEST 2 VIEWS: CPT

## 2024-01-05 PROCEDURE — 93005 ELECTROCARDIOGRAM TRACING: CPT | Performed by: OBSTETRICS & GYNECOLOGY

## 2024-01-05 PROCEDURE — 80053 COMPREHEN METABOLIC PANEL: CPT | Performed by: INTERNAL MEDICINE

## 2024-01-05 PROCEDURE — 85025 COMPLETE CBC W/AUTO DIFF WBC: CPT | Performed by: INTERNAL MEDICINE

## 2024-01-05 PROCEDURE — 85610 PROTHROMBIN TIME: CPT | Performed by: INTERNAL MEDICINE

## 2024-01-05 PROCEDURE — 36415 COLL VENOUS BLD VENIPUNCTURE: CPT | Performed by: INTERNAL MEDICINE

## 2024-01-09 ENCOUNTER — E-ADVICE (OUTPATIENT)
Dept: PREADMISSION TESTING | Age: 64
End: 2024-01-09

## 2024-01-10 ENCOUNTER — TELEPHONE (OUTPATIENT)
Dept: INTERNAL MEDICINE CLINIC | Facility: CLINIC | Age: 64
End: 2024-01-10

## 2024-01-10 ENCOUNTER — APPOINTMENT (OUTPATIENT)
Dept: MAMMOGRAPHY | Age: 64
End: 2024-01-10
Attending: INTERNAL MEDICINE

## 2024-01-10 NOTE — TELEPHONE ENCOUNTER
Called to confirm patient's appointment with Dr. Nuñez tomorrow at 10:00 A.M.  I reminded the patient to bring her paperwork for her presurgical visit.    She said she had her pre testing done at [facility].  She said she linked [employer] with both hospitals.  The only test she doesn't see test results on is her chest X-Ray.    Patient is asking if the office can check if those results have come thru.

## 2024-01-11 ENCOUNTER — OFFICE VISIT (OUTPATIENT)
Dept: INTERNAL MEDICINE CLINIC | Facility: CLINIC | Age: 64
End: 2024-01-11
Payer: COMMERCIAL

## 2024-01-11 VITALS
OXYGEN SATURATION: 98 % | HEIGHT: 62 IN | BODY MASS INDEX: 29.81 KG/M2 | HEART RATE: 72 BPM | SYSTOLIC BLOOD PRESSURE: 130 MMHG | DIASTOLIC BLOOD PRESSURE: 70 MMHG | WEIGHT: 162 LBS

## 2024-01-11 DIAGNOSIS — E11.9 DIABETES MELLITUS TYPE 2 IN NONOBESE (HCC): ICD-10-CM

## 2024-01-11 DIAGNOSIS — N85.8 UTERINE MASS: ICD-10-CM

## 2024-01-11 DIAGNOSIS — Z01.818 PREOPERATIVE CLEARANCE: Primary | ICD-10-CM

## 2024-01-11 DIAGNOSIS — R45.89 ANXIETY ABOUT HEALTH: ICD-10-CM

## 2024-01-11 LAB
CARTRIDGE LOT#: 641 NUMERIC
HEMOGLOBIN A1C: 6.8 % (ref 4.3–5.6)

## 2024-01-11 PROCEDURE — 99214 OFFICE O/P EST MOD 30 MIN: CPT | Performed by: INTERNAL MEDICINE

## 2024-01-11 PROCEDURE — 3078F DIAST BP <80 MM HG: CPT | Performed by: INTERNAL MEDICINE

## 2024-01-11 PROCEDURE — 3075F SYST BP GE 130 - 139MM HG: CPT | Performed by: INTERNAL MEDICINE

## 2024-01-11 PROCEDURE — 83036 HEMOGLOBIN GLYCOSYLATED A1C: CPT | Performed by: INTERNAL MEDICINE

## 2024-01-11 PROCEDURE — 3008F BODY MASS INDEX DOCD: CPT | Performed by: INTERNAL MEDICINE

## 2024-01-11 RX ORDER — ALPRAZOLAM 0.25 MG/1
0.25 TABLET ORAL EVERY 6 HOURS PRN
Qty: 30 TABLET | Refills: 1 | Status: SHIPPED | OUTPATIENT
Start: 2024-01-11

## 2024-01-11 NOTE — PROGRESS NOTES
Subjective:   Allison Smart is a 64 year old female who presents for Pre-Op Exam (Hysterectomy surg )     Patient here for preoperative clearance for elective hysterectomy and oopherectomy for uterine mass discovered on a ct scan of abdomen and pelvis.Surgery on 24    Med hx  Dm type 2 and mild htn.  History/Other:    Chief Complaint Reviewed and Verified  Nursing Notes Reviewed and   Verified  Medications Reviewed  Problem List Reviewed         Tobacco:  She smoked tobacco in the past but quit greater than 12 months ago.  Social History    Tobacco Use      Smoking status: Former        Types: Cigarettes        Quit date: 1995        Years since quittin.1      Smokeless tobacco: Never      Tobacco comments: quit       Current Outpatient Medications   Medication Sig Dispense Refill    GAVILYTE-G 236 g Oral Recon Soln  (Patient not taking: Reported on 10/26/2023)      SODIUM FLUORIDE 5000 PPM 1.1 % Dental Paste USE ON A DRY BRUSH. USING A PEA-SIZED AMOUNT, BRUSH ON AM AND PM, NO RINSING FOR 1/2 HR.      Omega-3 Fatty Acids (FISH OIL OR) Take by mouth.      Ergocalciferol (VITAMIN D OR) Take by mouth.      Cyanocobalamin (VITAMIN B-12 OR) Take by mouth.      semaglutide 2 MG/3ML Subcutaneous Solution Pen-injector Inject 0.25 mg into the skin once a week. (Patient taking differently: Inject 0.25 mg into the skin once a week. Injects on ) 1 each 11    metFORMIN  MG Oral Tablet 24 Hr Take 1 tablet (500 mg total) by mouth daily. 30 tablet 11    metFORMIN  MG Oral Tablet 24 Hr Take 1 tablet (500 mg total) by mouth daily with breakfast. 30 tablet 0    lisinopril 2.5 MG Oral Tab Take 1 tablet (2.5 mg total) by mouth daily. 30 tablet 11    Multiple Vitamins-Minerals (MULTIVITAMIN ADULT OR) Take by mouth.      Acetaminophen (TYLENOL EXTRA STRENGTH) 500 MG Oral Tab Take 1 tablet (500 mg total) by mouth every 6 (six) hours as needed.      Aspirin 325 MG Oral Tab Take 1 tablet (325 mg  total) by mouth daily. 1-2 daily prn           Review of Systems:  Review of Systems   Endocrine:        Bs controlled   Genitourinary:  Negative for pelvic pain.   All other systems reviewed and are negative.        Objective:   /70   Pulse 72   Ht 5' 2\" (1.575 m)   Wt 162 lb (73.5 kg)   SpO2 98%   BMI 29.63 kg/m²  Estimated body mass index is 29.63 kg/m² as calculated from the following:    Height as of this encounter: 5' 2\" (1.575 m).    Weight as of this encounter: 162 lb (73.5 kg).  Physical Exam  Constitutional:       Appearance: She is normal weight.   HENT:      Head: Normocephalic and atraumatic.      Mouth/Throat:      Pharynx: No posterior oropharyngeal erythema.   Eyes:      General: No scleral icterus.     Pupils: Pupils are equal, round, and reactive to light.   Cardiovascular:      Rate and Rhythm: Normal rate and regular rhythm.   Pulmonary:      Effort: Pulmonary effort is normal.      Breath sounds: Normal breath sounds.   Abdominal:      General: There is no distension.      Palpations: Abdomen is soft.      Tenderness: There is no abdominal tenderness.   Musculoskeletal:      Cervical back: Neck supple.      Right lower leg: No edema.      Left lower leg: No edema.   Skin:     Findings: No lesion.   Neurological:      Mental Status: She is alert. Mental status is at baseline.   Psychiatric:         Thought Content: Thought content normal.           Assessment & Plan:   1. Preoperative clearance (Primary) medically clear for hysterectomy and oopherectomy  2. Uterine mass as above  3. DIabetes mellitus type 2 in non obese - doing well with ozempic 0.5 mg and metformin - A1c 6.8                               No follow-ups on file.    Haylee Nuñez MD, 1/11/2024, 10:00 AM

## 2024-01-18 RX ORDER — PYRIDOXINE HCL (VITAMIN B6) 100 MG
200 TABLET ORAL DAILY
COMMUNITY

## 2024-01-18 RX ORDER — ACETAMINOPHEN 500 MG
500 TABLET ORAL EVERY 6 HOURS PRN
COMMUNITY

## 2024-01-18 ASSESSMENT — ACTIVITIES OF DAILY LIVING (ADL)
ADL_SCORE: 12
SENSORY_SUPPORT_DEVICES: EYEGLASSES
ADL_BEFORE_ADMISSION: INDEPENDENT

## 2024-01-19 LAB
ATRIAL RATE (BPM): 71
P AXIS (DEGREES): 36
PR-INTERVAL (MSEC): 152
QRS-INTERVAL (MSEC): 82
QT-INTERVAL (MSEC): 386
QTC: 419
R AXIS (DEGREES): 20
REPORT TEXT: NORMAL
T AXIS (DEGREES): 25
VENTRICULAR RATE EKG/MIN (BPM): 71

## 2024-01-20 ENCOUNTER — LAB SERVICES (OUTPATIENT)
Dept: LAB | Age: 64
End: 2024-01-20

## 2024-01-20 DIAGNOSIS — N85.8 OTHER SPECIFIED NONINFLAMMATORY DISORDERS OF UTERUS: Primary | ICD-10-CM

## 2024-01-20 LAB
ABO + RH BLD: NORMAL
BLD GP AB SCN SERPL QL GEL: NEGATIVE
TYPE AND SCREEN EXPIRATION DATE: NORMAL

## 2024-01-20 PROCEDURE — 86900 BLOOD TYPING SEROLOGIC ABO: CPT | Performed by: CLINICAL MEDICAL LABORATORY

## 2024-01-20 PROCEDURE — 36415 COLL VENOUS BLD VENIPUNCTURE: CPT | Performed by: CLINICAL MEDICAL LABORATORY

## 2024-01-20 PROCEDURE — 86901 BLOOD TYPING SEROLOGIC RH(D): CPT | Performed by: CLINICAL MEDICAL LABORATORY

## 2024-01-20 PROCEDURE — 86850 RBC ANTIBODY SCREEN: CPT | Performed by: CLINICAL MEDICAL LABORATORY

## 2024-01-22 ENCOUNTER — ANESTHESIA (OUTPATIENT)
Dept: SURGERY | Age: 64
End: 2024-01-22

## 2024-01-22 ENCOUNTER — HOSPITAL ENCOUNTER (OUTPATIENT)
Age: 64
Setting detail: OBSERVATION
Discharge: HOME | End: 2024-01-25
Attending: OBSTETRICS & GYNECOLOGY | Admitting: INTERNAL MEDICINE

## 2024-01-22 ENCOUNTER — ANESTHESIA EVENT (OUTPATIENT)
Dept: SURGERY | Age: 64
End: 2024-01-22

## 2024-01-22 DIAGNOSIS — G89.18 POST-OP PAIN: Primary | ICD-10-CM

## 2024-01-22 LAB
DEPRECATED RDW RBC: 47.8 FL (ref 39–50)
ERYTHROCYTE [DISTWIDTH] IN BLOOD: 13.2 % (ref 11–15)
GLUCOSE BLDC GLUCOMTR-MCNC: 112 MG/DL (ref 70–99)
GLUCOSE BLDC GLUCOMTR-MCNC: 179 MG/DL (ref 70–99)
HCT VFR BLD CALC: 42.2 % (ref 36–46.5)
HGB BLD-MCNC: 13.5 G/DL (ref 12–15.5)
MCH RBC QN AUTO: 31.1 PG (ref 26–34)
MCHC RBC AUTO-ENTMCNC: 32 G/DL (ref 32–36.5)
MCV RBC AUTO: 97.2 FL (ref 78–100)
NRBC BLD MANUAL-RTO: 0 /100 WBC
PLATELET # BLD AUTO: 230 K/MCL (ref 140–450)
RBC # BLD: 4.34 MIL/MCL (ref 4–5.2)
WBC # BLD: 9.4 K/MCL (ref 4.2–11)

## 2024-01-22 PROCEDURE — 10002803 HB RX 637: Performed by: OBSTETRICS & GYNECOLOGY

## 2024-01-22 PROCEDURE — 10002807 HB RX 258

## 2024-01-22 PROCEDURE — 13000099 HB GENERAL ROBOTIC CASE EA ADD MINUTE: Performed by: OBSTETRICS & GYNECOLOGY

## 2024-01-22 PROCEDURE — 10002800 HB RX 250 W HCPCS: Performed by: OBSTETRICS & GYNECOLOGY

## 2024-01-22 PROCEDURE — 13000098 HB GENERAL ROBOTIC CASE S/U + 1ST 15 MIN: Performed by: OBSTETRICS & GYNECOLOGY

## 2024-01-22 PROCEDURE — 10004651 HB RX, NO CHARGE ITEM: Performed by: OBSTETRICS & GYNECOLOGY

## 2024-01-22 PROCEDURE — 13000003 HB ANESTHESIA  GENERAL EA ADD MINUTE: Performed by: OBSTETRICS & GYNECOLOGY

## 2024-01-22 PROCEDURE — 88307 TISSUE EXAM BY PATHOLOGIST: CPT | Performed by: OBSTETRICS & GYNECOLOGY

## 2024-01-22 PROCEDURE — 10004180 HB COUNTER-TRANSPORT

## 2024-01-22 PROCEDURE — G0378 HOSPITAL OBSERVATION PER HR: HCPCS

## 2024-01-22 PROCEDURE — 10002016 HB COUNTER INCENTIVE SPIROMETRY

## 2024-01-22 PROCEDURE — 10004452 HB PACU ADDL 30 MINUTES: Performed by: OBSTETRICS & GYNECOLOGY

## 2024-01-22 PROCEDURE — 82962 GLUCOSE BLOOD TEST: CPT

## 2024-01-22 PROCEDURE — 10002803 HB RX 637

## 2024-01-22 PROCEDURE — 13001086 HB INCENTIVE SPIROMETER W INSTRUCT

## 2024-01-22 PROCEDURE — 10002800 HB RX 250 W HCPCS

## 2024-01-22 PROCEDURE — 10004451 HB PACU RECOVERY 1ST 30 MINUTES: Performed by: OBSTETRICS & GYNECOLOGY

## 2024-01-22 PROCEDURE — 85027 COMPLETE CBC AUTOMATED: CPT | Performed by: OBSTETRICS & GYNECOLOGY

## 2024-01-22 PROCEDURE — 88305 TISSUE EXAM BY PATHOLOGIST: CPT | Performed by: OBSTETRICS & GYNECOLOGY

## 2024-01-22 PROCEDURE — 13000002 HB ANESTHESIA  GENERAL  S/U + 1ST 15 MIN: Performed by: OBSTETRICS & GYNECOLOGY

## 2024-01-22 PROCEDURE — 10002801 HB RX 250 W/O HCPCS: Performed by: OBSTETRICS & GYNECOLOGY

## 2024-01-22 PROCEDURE — 10002801 HB RX 250 W/O HCPCS

## 2024-01-22 PROCEDURE — 10006023 HB SUPPLY 272: Performed by: OBSTETRICS & GYNECOLOGY

## 2024-01-22 RX ORDER — 0.9 % SODIUM CHLORIDE 0.9 %
2 VIAL (ML) INJECTION EVERY 12 HOURS SCHEDULED
Status: DISCONTINUED | OUTPATIENT
Start: 2024-01-22 | End: 2024-01-25 | Stop reason: HOSPADM

## 2024-01-22 RX ORDER — GABAPENTIN 100 MG/1
200 CAPSULE ORAL EVERY 8 HOURS SCHEDULED
Status: DISCONTINUED | OUTPATIENT
Start: 2024-01-22 | End: 2024-01-25 | Stop reason: HOSPADM

## 2024-01-22 RX ORDER — FLUMAZENIL 0.1 MG/ML
INJECTION, SOLUTION INTRAVENOUS PRN
Status: DISCONTINUED | OUTPATIENT
Start: 2024-01-22 | End: 2024-01-22

## 2024-01-22 RX ORDER — FAMOTIDINE 20 MG/1
20 TABLET, FILM COATED ORAL
Status: COMPLETED | OUTPATIENT
Start: 2024-01-22 | End: 2024-01-22

## 2024-01-22 RX ORDER — HYDROCODONE BITARTRATE AND ACETAMINOPHEN 10; 325 MG/1; MG/1
1 TABLET ORAL EVERY 4 HOURS PRN
Status: DISCONTINUED | OUTPATIENT
Start: 2024-01-22 | End: 2024-01-25 | Stop reason: HOSPADM

## 2024-01-22 RX ORDER — PROPOFOL 10 MG/ML
INJECTION, EMULSION INTRAVENOUS PRN
Status: DISCONTINUED | OUTPATIENT
Start: 2024-01-22 | End: 2024-01-22

## 2024-01-22 RX ORDER — LIDOCAINE HYDROCHLORIDE 40 MG/ML
SOLUTION TOPICAL PRN
Status: DISCONTINUED | OUTPATIENT
Start: 2024-01-22 | End: 2024-01-22

## 2024-01-22 RX ORDER — GLYCOPYRROLATE 0.2 MG/ML
INJECTION, SOLUTION INTRAMUSCULAR; INTRAVENOUS PRN
Status: DISCONTINUED | OUTPATIENT
Start: 2024-01-22 | End: 2024-01-22

## 2024-01-22 RX ORDER — ONDANSETRON 2 MG/ML
INJECTION INTRAMUSCULAR; INTRAVENOUS PRN
Status: DISCONTINUED | OUTPATIENT
Start: 2024-01-22 | End: 2024-01-22

## 2024-01-22 RX ORDER — 0.9 % SODIUM CHLORIDE 0.9 %
2 VIAL (ML) INJECTION EVERY 12 HOURS SCHEDULED
Status: DISCONTINUED | OUTPATIENT
Start: 2024-01-22 | End: 2024-01-22 | Stop reason: HOSPADM

## 2024-01-22 RX ORDER — LIDOCAINE HYDROCHLORIDE 20 MG/ML
INJECTION, SOLUTION INFILTRATION; PERINEURAL PRN
Status: DISCONTINUED | OUTPATIENT
Start: 2024-01-22 | End: 2024-01-22

## 2024-01-22 RX ORDER — HUMAN INSULIN 100 [IU]/ML
INJECTION, SOLUTION SUBCUTANEOUS
Status: DISCONTINUED | OUTPATIENT
Start: 2024-01-22 | End: 2024-01-22 | Stop reason: HOSPADM

## 2024-01-22 RX ORDER — NICOTINE POLACRILEX 4 MG
30 LOZENGE BUCCAL PRN
Status: DISCONTINUED | OUTPATIENT
Start: 2024-01-22 | End: 2024-01-25 | Stop reason: HOSPADM

## 2024-01-22 RX ORDER — HYDRALAZINE HYDROCHLORIDE 20 MG/ML
5 INJECTION INTRAMUSCULAR; INTRAVENOUS EVERY 10 MIN PRN
Status: DISCONTINUED | OUTPATIENT
Start: 2024-01-22 | End: 2024-01-22 | Stop reason: HOSPADM

## 2024-01-22 RX ORDER — NICOTINE POLACRILEX 4 MG
15 LOZENGE BUCCAL PRN
Status: DISCONTINUED | OUTPATIENT
Start: 2024-01-22 | End: 2024-01-25 | Stop reason: HOSPADM

## 2024-01-22 RX ORDER — ACETAMINOPHEN 325 MG/1
650 TABLET ORAL
Status: ACTIVE | OUTPATIENT
Start: 2024-01-22 | End: 2024-01-22

## 2024-01-22 RX ORDER — GABAPENTIN 300 MG/1
300 CAPSULE ORAL ONCE
Status: COMPLETED | OUTPATIENT
Start: 2024-01-22 | End: 2024-01-22

## 2024-01-22 RX ORDER — MIDAZOLAM HYDROCHLORIDE 1 MG/ML
INJECTION, SOLUTION INTRAMUSCULAR; INTRAVENOUS PRN
Status: DISCONTINUED | OUTPATIENT
Start: 2024-01-22 | End: 2024-01-22

## 2024-01-22 RX ORDER — DROPERIDOL 2.5 MG/ML
0.62 INJECTION, SOLUTION INTRAMUSCULAR; INTRAVENOUS
Status: DISCONTINUED | OUTPATIENT
Start: 2024-01-22 | End: 2024-01-22 | Stop reason: HOSPADM

## 2024-01-22 RX ORDER — AMOXICILLIN 250 MG
2 CAPSULE ORAL 2 TIMES DAILY PRN
Status: DISCONTINUED | OUTPATIENT
Start: 2024-01-22 | End: 2024-01-23

## 2024-01-22 RX ORDER — OXYCODONE HYDROCHLORIDE 5 MG/1
5 TABLET ORAL
Status: ACTIVE | OUTPATIENT
Start: 2024-01-22 | End: 2024-01-22

## 2024-01-22 RX ORDER — DOCUSATE SODIUM 100 MG/1
100 CAPSULE, LIQUID FILLED ORAL 2 TIMES DAILY
Qty: 30 CAPSULE | Refills: 0 | Status: SHIPPED | OUTPATIENT
Start: 2024-01-22

## 2024-01-22 RX ORDER — NEOSTIGMINE METHYLSULFATE 4 MG/4 ML
SYRINGE (ML) INTRAVENOUS PRN
Status: DISCONTINUED | OUTPATIENT
Start: 2024-01-22 | End: 2024-01-22

## 2024-01-22 RX ORDER — DEXTROSE MONOHYDRATE 25 G/50ML
25 INJECTION, SOLUTION INTRAVENOUS PRN
Status: DISCONTINUED | OUTPATIENT
Start: 2024-01-22 | End: 2024-01-22 | Stop reason: HOSPADM

## 2024-01-22 RX ORDER — ONDANSETRON 2 MG/ML
4 INJECTION INTRAMUSCULAR; INTRAVENOUS
Status: ACTIVE | OUTPATIENT
Start: 2024-01-22 | End: 2024-01-22

## 2024-01-22 RX ORDER — BISACODYL 10 MG
10 SUPPOSITORY, RECTAL RECTAL DAILY PRN
Status: DISCONTINUED | OUTPATIENT
Start: 2024-01-22 | End: 2024-01-25 | Stop reason: HOSPADM

## 2024-01-22 RX ORDER — DEXTROSE MONOHYDRATE 25 G/50ML
25 INJECTION, SOLUTION INTRAVENOUS PRN
Status: DISCONTINUED | OUTPATIENT
Start: 2024-01-22 | End: 2024-01-25 | Stop reason: HOSPADM

## 2024-01-22 RX ORDER — POLYETHYLENE GLYCOL 3350 17 G/17G
17 POWDER, FOR SOLUTION ORAL DAILY
Status: DISCONTINUED | OUTPATIENT
Start: 2024-01-22 | End: 2024-01-25 | Stop reason: HOSPADM

## 2024-01-22 RX ORDER — DEXAMETHASONE SODIUM PHOSPHATE 4 MG/ML
INJECTION, SOLUTION INTRA-ARTICULAR; INTRALESIONAL; INTRAMUSCULAR; INTRAVENOUS; SOFT TISSUE PRN
Status: DISCONTINUED | OUTPATIENT
Start: 2024-01-22 | End: 2024-01-22

## 2024-01-22 RX ORDER — PROCHLORPERAZINE EDISYLATE 5 MG/ML
5 INJECTION INTRAMUSCULAR; INTRAVENOUS EVERY 4 HOURS PRN
Status: DISCONTINUED | OUTPATIENT
Start: 2024-01-22 | End: 2024-01-25 | Stop reason: HOSPADM

## 2024-01-22 RX ORDER — HYDRALAZINE HYDROCHLORIDE 20 MG/ML
INJECTION INTRAMUSCULAR; INTRAVENOUS PRN
Status: DISCONTINUED | OUTPATIENT
Start: 2024-01-22 | End: 2024-01-22

## 2024-01-22 RX ORDER — ROCURONIUM BROMIDE 10 MG/ML
INJECTION, SOLUTION INTRAVENOUS PRN
Status: DISCONTINUED | OUTPATIENT
Start: 2024-01-22 | End: 2024-01-22

## 2024-01-22 RX ORDER — ACETAMINOPHEN 325 MG/1
650 TABLET ORAL ONCE
Status: COMPLETED | OUTPATIENT
Start: 2024-01-22 | End: 2024-01-22

## 2024-01-22 RX ORDER — ONDANSETRON 2 MG/ML
4 INJECTION INTRAMUSCULAR; INTRAVENOUS EVERY 12 HOURS PRN
Status: DISCONTINUED | OUTPATIENT
Start: 2024-01-22 | End: 2024-01-25 | Stop reason: HOSPADM

## 2024-01-22 RX ORDER — SIMETHICONE 125 MG
125 TABLET,CHEWABLE ORAL EVERY 4 HOURS PRN
Status: DISCONTINUED | OUTPATIENT
Start: 2024-01-22 | End: 2024-01-25 | Stop reason: HOSPADM

## 2024-01-22 RX ORDER — DEXTROSE MONOHYDRATE 25 G/50ML
12.5 INJECTION, SOLUTION INTRAVENOUS PRN
Status: DISCONTINUED | OUTPATIENT
Start: 2024-01-22 | End: 2024-01-25 | Stop reason: HOSPADM

## 2024-01-22 RX ORDER — SODIUM CHLORIDE 9 MG/ML
INJECTION, SOLUTION INTRAVENOUS CONTINUOUS
Status: DISCONTINUED | OUTPATIENT
Start: 2024-01-22 | End: 2024-01-22 | Stop reason: HOSPADM

## 2024-01-22 RX ORDER — SODIUM CHLORIDE, SODIUM LACTATE, POTASSIUM CHLORIDE, CALCIUM CHLORIDE 600; 310; 30; 20 MG/100ML; MG/100ML; MG/100ML; MG/100ML
INJECTION, SOLUTION INTRAVENOUS CONTINUOUS
Status: DISCONTINUED | OUTPATIENT
Start: 2024-01-22 | End: 2024-01-22 | Stop reason: HOSPADM

## 2024-01-22 RX ORDER — IBUPROFEN 600 MG/1
600 TABLET ORAL EVERY 6 HOURS PRN
Qty: 30 TABLET | Refills: 0 | Status: SHIPPED | OUTPATIENT
Start: 2024-01-22

## 2024-01-22 RX ORDER — LISINOPRIL 5 MG/1
5 TABLET ORAL DAILY
Status: DISCONTINUED | OUTPATIENT
Start: 2024-01-23 | End: 2024-01-25 | Stop reason: HOSPADM

## 2024-01-22 RX ORDER — SODIUM CHLORIDE, SODIUM LACTATE, POTASSIUM CHLORIDE, CALCIUM CHLORIDE 600; 310; 30; 20 MG/100ML; MG/100ML; MG/100ML; MG/100ML
INJECTION, SOLUTION INTRAVENOUS CONTINUOUS
Status: DISCONTINUED | OUTPATIENT
Start: 2024-01-22 | End: 2024-01-23

## 2024-01-22 RX ORDER — BUPIVACAINE HYDROCHLORIDE 2.5 MG/ML
INJECTION, SOLUTION EPIDURAL; INFILTRATION; INTRACAUDAL PRN
Status: DISCONTINUED | OUTPATIENT
Start: 2024-01-22 | End: 2024-01-22 | Stop reason: HOSPADM

## 2024-01-22 RX ORDER — HYDROCODONE BITARTRATE AND ACETAMINOPHEN 5; 325 MG/1; MG/1
1 TABLET ORAL EVERY 6 HOURS PRN
Qty: 20 TABLET | Refills: 0 | Status: SHIPPED | OUTPATIENT
Start: 2024-01-22

## 2024-01-22 RX ORDER — PROCHLORPERAZINE MALEATE 5 MG/1
5 TABLET ORAL EVERY 4 HOURS PRN
Status: DISCONTINUED | OUTPATIENT
Start: 2024-01-22 | End: 2024-01-25 | Stop reason: HOSPADM

## 2024-01-22 RX ORDER — DEXTROSE MONOHYDRATE 50 MG/ML
INJECTION, SOLUTION INTRAVENOUS CONTINUOUS PRN
Status: DISCONTINUED | OUTPATIENT
Start: 2024-01-22 | End: 2024-01-22

## 2024-01-22 RX ORDER — ONDANSETRON 4 MG/1
4 TABLET, ORALLY DISINTEGRATING ORAL EVERY 12 HOURS PRN
Status: DISCONTINUED | OUTPATIENT
Start: 2024-01-22 | End: 2024-01-25 | Stop reason: HOSPADM

## 2024-01-22 RX ORDER — HYDROCODONE BITARTRATE AND ACETAMINOPHEN 5; 325 MG/1; MG/1
1 TABLET ORAL EVERY 4 HOURS PRN
Status: DISCONTINUED | OUTPATIENT
Start: 2024-01-22 | End: 2024-01-25 | Stop reason: HOSPADM

## 2024-01-22 RX ADMIN — HYDROMORPHONE HYDROCHLORIDE 0.2 MG: 1 INJECTION, SOLUTION INTRAMUSCULAR; INTRAVENOUS; SUBCUTANEOUS at 13:57

## 2024-01-22 RX ADMIN — HYDROMORPHONE HYDROCHLORIDE 0.2 MG: 1 INJECTION, SOLUTION INTRAMUSCULAR; INTRAVENOUS; SUBCUTANEOUS at 13:42

## 2024-01-22 RX ADMIN — ROCURONIUM BROMIDE 5 MG: 10 INJECTION INTRAVENOUS at 10:37

## 2024-01-22 RX ADMIN — GABAPENTIN 200 MG: 100 CAPSULE ORAL at 21:06

## 2024-01-22 RX ADMIN — PROPOFOL 120 MG: 10 INJECTION, EMULSION INTRAVENOUS at 10:37

## 2024-01-22 RX ADMIN — PROPOFOL 40 MG: 10 INJECTION, EMULSION INTRAVENOUS at 12:37

## 2024-01-22 RX ADMIN — FLUMAZENIL 0.2 MG: 0.1 INJECTION, SOLUTION INTRAVENOUS at 13:18

## 2024-01-22 RX ADMIN — GABAPENTIN 200 MG: 100 CAPSULE ORAL at 17:14

## 2024-01-22 RX ADMIN — GLYCOPYRROLATE 0.4 MG: 0.2 INJECTION, SOLUTION INTRAMUSCULAR; INTRAVENOUS at 13:03

## 2024-01-22 RX ADMIN — ROCURONIUM BROMIDE 35 MG: 10 INJECTION INTRAVENOUS at 10:49

## 2024-01-22 RX ADMIN — HYDRALAZINE HYDROCHLORIDE 10 MG: 20 INJECTION, SOLUTION INTRAMUSCULAR; INTRAVENOUS at 13:20

## 2024-01-22 RX ADMIN — ONDANSETRON 4 MG: 2 INJECTION INTRAMUSCULAR; INTRAVENOUS at 11:43

## 2024-01-22 RX ADMIN — Medication 100 MG: at 10:38

## 2024-01-22 RX ADMIN — SODIUM CHLORIDE, POTASSIUM CHLORIDE, SODIUM LACTATE AND CALCIUM CHLORIDE: 600; 310; 30; 20 INJECTION, SOLUTION INTRAVENOUS at 08:59

## 2024-01-22 RX ADMIN — HYDROCODONE BITARTRATE AND ACETAMINOPHEN 1 TABLET: 5; 325 TABLET ORAL at 17:15

## 2024-01-22 RX ADMIN — LABETALOL HYDROCHLORIDE 5 MG: 5 INJECTION, SOLUTION INTRAVENOUS at 11:15

## 2024-01-22 RX ADMIN — MORPHINE SULFATE 2 MG: 2 INJECTION, SOLUTION INTRAMUSCULAR; INTRAVENOUS at 19:35

## 2024-01-22 RX ADMIN — HYDROMORPHONE HYDROCHLORIDE 0.2 MG: 1 INJECTION, SOLUTION INTRAMUSCULAR; INTRAVENOUS; SUBCUTANEOUS at 13:52

## 2024-01-22 RX ADMIN — PROPOFOL 40 MG: 10 INJECTION, EMULSION INTRAVENOUS at 11:09

## 2024-01-22 RX ADMIN — GABAPENTIN 300 MG: 300 CAPSULE ORAL at 08:48

## 2024-01-22 RX ADMIN — METRONIDAZOLE 500 MG: 500 INJECTION, SOLUTION INTRAVENOUS at 10:49

## 2024-01-22 RX ADMIN — HYDROMORPHONE HYDROCHLORIDE 0.2 MG: 1 INJECTION, SOLUTION INTRAMUSCULAR; INTRAVENOUS; SUBCUTANEOUS at 13:47

## 2024-01-22 RX ADMIN — ROCURONIUM BROMIDE 10 MG: 10 INJECTION INTRAVENOUS at 11:16

## 2024-01-22 RX ADMIN — HYDROCODONE BITARTRATE AND ACETAMINOPHEN 1 TABLET: 10; 325 TABLET ORAL at 22:12

## 2024-01-22 RX ADMIN — MIDAZOLAM HYDROCHLORIDE 2 MG: 1 INJECTION, SOLUTION INTRAMUSCULAR; INTRAVENOUS at 10:29

## 2024-01-22 RX ADMIN — LIDOCAINE HYDROCHLORIDE 3 ML: 20 INJECTION, SOLUTION INFILTRATION; PERINEURAL at 10:37

## 2024-01-22 RX ADMIN — LIDOCAINE HYDROCHLORIDE 1 ML: 20 INJECTION, SOLUTION INFILTRATION; PERINEURAL at 12:37

## 2024-01-22 RX ADMIN — Medication 3 MG: at 13:03

## 2024-01-22 RX ADMIN — POLYETHYLENE GLYCOL (3350) 17 G: 17 POWDER, FOR SOLUTION ORAL at 21:06

## 2024-01-22 RX ADMIN — ROCURONIUM BROMIDE 10 MG: 10 INJECTION INTRAVENOUS at 11:47

## 2024-01-22 RX ADMIN — ACETAMINOPHEN 650 MG: 325 TABLET ORAL at 08:48

## 2024-01-22 RX ADMIN — LIDOCAINE HYDROCHLORIDE 1 ML: 20 INJECTION, SOLUTION INFILTRATION; PERINEURAL at 11:09

## 2024-01-22 RX ADMIN — DEXAMETHASONE SODIUM PHOSPHATE 8 MG: 4 INJECTION INTRA-ARTICULAR; INTRALESIONAL; INTRAMUSCULAR; INTRAVENOUS; SOFT TISSUE at 10:49

## 2024-01-22 RX ADMIN — FENTANYL CITRATE 100 MCG: 50 INJECTION INTRAMUSCULAR; INTRAVENOUS at 10:37

## 2024-01-22 RX ADMIN — FAMOTIDINE 20 MG: 20 TABLET ORAL at 08:48

## 2024-01-22 RX ADMIN — HYDROMORPHONE HYDROCHLORIDE 0.2 MG: 1 INJECTION, SOLUTION INTRAMUSCULAR; INTRAVENOUS; SUBCUTANEOUS at 14:02

## 2024-01-22 RX ADMIN — CEFAZOLIN SODIUM 2000 MG: 300 INJECTION, POWDER, LYOPHILIZED, FOR SOLUTION INTRAVENOUS at 10:37

## 2024-01-22 RX ADMIN — LIDOCAINE HYDROCHLORIDE 1 APPLICATION: 40 SOLUTION TOPICAL at 10:38

## 2024-01-22 SDOH — ECONOMIC STABILITY: INCOME INSECURITY: IN THE PAST 12 MONTHS, HAS THE ELECTRIC, GAS, OIL, OR WATER COMPANY THREATENED TO SHUT OFF SERVICE IN YOUR HOME?: NO

## 2024-01-22 SDOH — HEALTH STABILITY: PHYSICAL HEALTH: DO YOU HAVE SERIOUS DIFFICULTY WALKING OR CLIMBING STAIRS?: NO

## 2024-01-22 SDOH — ECONOMIC STABILITY: GENERAL

## 2024-01-22 SDOH — ECONOMIC STABILITY: HOUSING INSECURITY: WHAT IS YOUR LIVING SITUATION TODAY?: HOUSE

## 2024-01-22 SDOH — HEALTH STABILITY: GENERAL
BECAUSE OF A PHYSICAL, MENTAL, OR EMOTIONAL CONDITION, DO YOU HAVE SERIOUS DIFFICULTY CONCENTRATING, REMEMBERING OR MAKING DECISIONS?: NO

## 2024-01-22 SDOH — SOCIAL STABILITY: SOCIAL NETWORK: SUPPORT SYSTEMS: SPOUSE;FAMILY MEMBERS

## 2024-01-22 SDOH — ECONOMIC STABILITY: TRANSPORTATION INSECURITY
IN THE PAST 12 MONTHS, HAS LACK OF RELIABLE TRANSPORTATION KEPT YOU FROM MEDICAL APPOINTMENTS, MEETINGS, WORK OR FROM GETTING THINGS NEEDED FOR DAILY LIVING?: NO

## 2024-01-22 SDOH — SOCIAL STABILITY: SOCIAL INSECURITY: HOW OFTEN DOES ANYONE, INCLUDING FAMILY AND FRIENDS, PHYSICALLY HURT YOU?: NEVER

## 2024-01-22 SDOH — SOCIAL STABILITY: SOCIAL NETWORK
HOW OFTEN DO YOU SEE OR TALK TO PEOPLE THAT YOU CARE ABOUT AND FEEL CLOSE TO? (FOR EXAMPLE: TALKING TO FRIENDS ON THE PHONE, VISITING FRIENDS OR FAMILY, GOING TO CHURCH OR CLUB MEETINGS): 3 TO 5 TIMES A WEEK

## 2024-01-22 SDOH — ECONOMIC STABILITY: HOUSING INSECURITY: WHAT IS YOUR LIVING SITUATION TODAY?: SPOUSE

## 2024-01-22 SDOH — HEALTH STABILITY: PHYSICAL HEALTH: DO YOU HAVE DIFFICULTY DRESSING OR BATHING?: NO

## 2024-01-22 SDOH — ECONOMIC STABILITY: FOOD INSECURITY: WITHIN THE PAST 12 MONTHS, THE FOOD YOU BOUGHT JUST DIDN'T LAST AND YOU DIDN'T HAVE MONEY TO GET MORE.: NEVER TRUE

## 2024-01-22 SDOH — ECONOMIC STABILITY: GENERAL: WOULD YOU LIKE HELP WITH ANY OF THE FOLLOWING NEEDS?: I DON'T WANT HELP WITH ANY OF THESE

## 2024-01-22 SDOH — SOCIAL STABILITY: SOCIAL INSECURITY: HOW OFTEN DOES ANYONE, INCLUDING FAMILY AND FRIENDS, INSULT OR TALK DOWN TO YOU?: NEVER

## 2024-01-22 SDOH — ECONOMIC STABILITY: HOUSING INSECURITY: WHAT IS YOUR LIVING SITUATION TODAY?: I HAVE A STEADY PLACE TO LIVE

## 2024-01-22 SDOH — SOCIAL STABILITY: SOCIAL INSECURITY: HOW OFTEN DOES ANYONE, INCLUDING FAMILY AND FRIENDS, SCREAM OR CURSE AT YOU?: NEVER

## 2024-01-22 SDOH — ECONOMIC STABILITY: HOUSING INSECURITY: DO YOU HAVE PROBLEMS WITH ANY OF THE FOLLOWING?: NONE OF THE ABOVE

## 2024-01-22 SDOH — SOCIAL STABILITY: SOCIAL INSECURITY: HOW OFTEN DOES ANYONE, INCLUDING FAMILY AND FRIENDS, THREATEN YOU WITH HARM?: NEVER

## 2024-01-22 SDOH — HEALTH STABILITY: GENERAL: BECAUSE OF A PHYSICAL, MENTAL, OR EMOTIONAL CONDITION, DO YOU HAVE DIFFICULTY DOING ERRANDS ALONE?: NO

## 2024-01-22 ASSESSMENT — COLUMBIA-SUICIDE SEVERITY RATING SCALE - C-SSRS
IS THE PATIENT ABLE TO COMPLETE C-SSRS: YES
6. HAVE YOU EVER DONE ANYTHING, STARTED TO DO ANYTHING, OR PREPARED TO DO ANYTHING TO END YOUR LIFE?: NO
1. WITHIN THE PAST MONTH, HAVE YOU WISHED YOU WERE DEAD OR WISHED YOU COULD GO TO SLEEP AND NOT WAKE UP?: NO
2. HAVE YOU ACTUALLY HAD ANY THOUGHTS OF KILLING YOURSELF?: NO

## 2024-01-22 ASSESSMENT — PATIENT HEALTH QUESTIONNAIRE - PHQ9
SUM OF ALL RESPONSES TO PHQ9 QUESTIONS 1 AND 2: 0
SUM OF ALL RESPONSES TO PHQ9 QUESTIONS 1 AND 2: 0
IS PATIENT ABLE TO COMPLETE PHQ2 OR PHQ9: YES
CLINICAL INTERPRETATION OF PHQ2 SCORE: NO FURTHER SCREENING NEEDED
1. LITTLE INTEREST OR PLEASURE IN DOING THINGS: NOT AT ALL
2. FEELING DOWN, DEPRESSED OR HOPELESS: NOT AT ALL

## 2024-01-22 ASSESSMENT — PAIN SCALES - GENERAL
PAINLEVEL_OUTOF10: 7
PAINLEVEL_OUTOF10: 7
PAINLEVEL_OUTOF10: 2
PAINLEVEL_OUTOF10: 6
PAINLEVEL_OUTOF10: 5
PAINLEVEL_OUTOF10: 5
PAINLEVEL_OUTOF10: 7
PAINLEVEL_OUTOF10: 7
PAINLEVEL_OUTOF10: 6
PAINLEVEL_OUTOF10: 0

## 2024-01-22 ASSESSMENT — LIFESTYLE VARIABLES
HOW OFTEN DO YOU HAVE A DRINK CONTAINING ALCOHOL: NEVER
HOW MANY STANDARD DRINKS CONTAINING ALCOHOL DO YOU HAVE ON A TYPICAL DAY: 0,1 OR 2
AUDIT-C TOTAL SCORE: 0
ALCOHOL_USE_STATUS: NO OR LOW RISK WITH VALIDATED TOOL
HOW OFTEN DO YOU HAVE 6 OR MORE DRINKS ON ONE OCCASION: NEVER

## 2024-01-22 ASSESSMENT — ACTIVITIES OF DAILY LIVING (ADL)
RECENT_DECLINE_ADL: NO
ADL_BEFORE_ADMISSION: INDEPENDENT
ADL_SCORE: 12
ADL_SHORT_OF_BREATH: NO

## 2024-01-22 ASSESSMENT — PAIN SCALES - WONG BAKER
WONGBAKER_NUMERICALRESPONSE: 0

## 2024-01-23 PROBLEM — E11.9 DIABETES MELLITUS TYPE 2, UNCOMPLICATED (CMD): Status: ACTIVE | Noted: 2024-01-23

## 2024-01-23 PROBLEM — I10 ESSENTIAL (PRIMARY) HYPERTENSION: Status: ACTIVE | Noted: 2024-01-23

## 2024-01-23 LAB
ANION GAP SERPL CALC-SCNC: 7 MMOL/L (ref 7–19)
BASOPHILS # BLD: 0 K/MCL (ref 0–0.3)
BASOPHILS NFR BLD: 0 %
BUN SERPL-MCNC: 9 MG/DL (ref 6–20)
BUN/CREAT SERPL: 16 (ref 7–25)
CALCIUM SERPL-MCNC: 8.7 MG/DL (ref 8.4–10.2)
CHLORIDE SERPL-SCNC: 107 MMOL/L (ref 97–110)
CO2 SERPL-SCNC: 29 MMOL/L (ref 21–32)
CREAT SERPL-MCNC: 0.58 MG/DL (ref 0.51–0.95)
DEPRECATED RDW RBC: 47.4 FL (ref 39–50)
EGFRCR SERPLBLD CKD-EPI 2021: >90 ML/MIN/{1.73_M2}
EOSINOPHIL # BLD: 0 K/MCL (ref 0–0.5)
EOSINOPHIL NFR BLD: 0 %
ERYTHROCYTE [DISTWIDTH] IN BLOOD: 13.2 % (ref 11–15)
FASTING DURATION TIME PATIENT: ABNORMAL H
GLUCOSE BLDC GLUCOMTR-MCNC: 120 MG/DL (ref 70–99)
GLUCOSE BLDC GLUCOMTR-MCNC: 164 MG/DL (ref 70–99)
GLUCOSE BLDC GLUCOMTR-MCNC: 170 MG/DL (ref 70–99)
GLUCOSE SERPL-MCNC: 133 MG/DL (ref 70–99)
HCT VFR BLD CALC: 37.9 % (ref 36–46.5)
HGB BLD-MCNC: 12.1 G/DL (ref 12–15.5)
IMM GRANULOCYTES # BLD AUTO: 0.1 K/MCL (ref 0–0.2)
IMM GRANULOCYTES # BLD: 1 %
LYMPHOCYTES # BLD: 3.3 K/MCL (ref 1–4)
LYMPHOCYTES NFR BLD: 18 %
MCH RBC QN AUTO: 30.7 PG (ref 26–34)
MCHC RBC AUTO-ENTMCNC: 31.9 G/DL (ref 32–36.5)
MCV RBC AUTO: 96.2 FL (ref 78–100)
MONOCYTES # BLD: 1.7 K/MCL (ref 0.3–0.9)
MONOCYTES NFR BLD: 9 %
NEUTROPHILS # BLD: 13.6 K/MCL (ref 1.8–7.7)
NEUTROPHILS NFR BLD: 72 %
NRBC BLD MANUAL-RTO: 0 /100 WBC
PLATELET # BLD AUTO: 211 K/MCL (ref 140–450)
POTASSIUM SERPL-SCNC: 4.3 MMOL/L (ref 3.4–5.1)
RBC # BLD: 3.94 MIL/MCL (ref 4–5.2)
SODIUM SERPL-SCNC: 139 MMOL/L (ref 135–145)
WBC # BLD: 18.8 K/MCL (ref 4.2–11)

## 2024-01-23 PROCEDURE — 36415 COLL VENOUS BLD VENIPUNCTURE: CPT | Performed by: OBSTETRICS & GYNECOLOGY

## 2024-01-23 PROCEDURE — 10002807 HB RX 258: Performed by: OBSTETRICS & GYNECOLOGY

## 2024-01-23 PROCEDURE — 96372 THER/PROPH/DIAG INJ SC/IM: CPT | Performed by: INTERNAL MEDICINE

## 2024-01-23 PROCEDURE — 10002803 HB RX 637: Performed by: OBSTETRICS & GYNECOLOGY

## 2024-01-23 PROCEDURE — 10004651 HB RX, NO CHARGE ITEM: Performed by: OBSTETRICS & GYNECOLOGY

## 2024-01-23 PROCEDURE — 80048 BASIC METABOLIC PNL TOTAL CA: CPT | Performed by: OBSTETRICS & GYNECOLOGY

## 2024-01-23 PROCEDURE — 10002800 HB RX 250 W HCPCS: Performed by: OBSTETRICS & GYNECOLOGY

## 2024-01-23 PROCEDURE — G0378 HOSPITAL OBSERVATION PER HR: HCPCS

## 2024-01-23 PROCEDURE — 10002803 HB RX 637: Performed by: INTERNAL MEDICINE

## 2024-01-23 PROCEDURE — 85025 COMPLETE CBC W/AUTO DIFF WBC: CPT | Performed by: OBSTETRICS & GYNECOLOGY

## 2024-01-23 PROCEDURE — 82962 GLUCOSE BLOOD TEST: CPT

## 2024-01-23 PROCEDURE — 10002800 HB RX 250 W HCPCS: Performed by: INTERNAL MEDICINE

## 2024-01-23 RX ORDER — LISINOPRIL 2.5 MG/1
2.5 TABLET ORAL DAILY
COMMUNITY

## 2024-01-23 RX ORDER — CALCIUM CARBONATE 300MG(750)
1 TABLET,CHEWABLE ORAL DAILY
COMMUNITY

## 2024-01-23 RX ORDER — AMOXICILLIN 250 MG
1 CAPSULE ORAL 2 TIMES DAILY
Status: DISCONTINUED | OUTPATIENT
Start: 2024-01-23 | End: 2024-01-25 | Stop reason: HOSPADM

## 2024-01-23 RX ORDER — ALPRAZOLAM 0.25 MG/1
0.25 TABLET ORAL NIGHTLY PRN
COMMUNITY

## 2024-01-23 RX ADMIN — LISINOPRIL 5 MG: 5 TABLET ORAL at 09:06

## 2024-01-23 RX ADMIN — HYDROCODONE BITARTRATE AND ACETAMINOPHEN 1 TABLET: 10; 325 TABLET ORAL at 11:36

## 2024-01-23 RX ADMIN — ONDANSETRON 4 MG: 4 TABLET, ORALLY DISINTEGRATING ORAL at 20:22

## 2024-01-23 RX ADMIN — SENNOSIDES AND DOCUSATE SODIUM 1 TABLET: 8.6; 5 TABLET ORAL at 20:22

## 2024-01-23 RX ADMIN — GABAPENTIN 200 MG: 100 CAPSULE ORAL at 14:28

## 2024-01-23 RX ADMIN — GABAPENTIN 200 MG: 100 CAPSULE ORAL at 06:31

## 2024-01-23 RX ADMIN — SENNOSIDES AND DOCUSATE SODIUM 1 TABLET: 8.6; 5 TABLET ORAL at 12:10

## 2024-01-23 RX ADMIN — INSULIN LISPRO 1 UNITS: 100 INJECTION, SOLUTION INTRAVENOUS; SUBCUTANEOUS at 17:05

## 2024-01-23 RX ADMIN — SODIUM CHLORIDE, PRESERVATIVE FREE 2 ML: 5 INJECTION INTRAVENOUS at 20:19

## 2024-01-23 RX ADMIN — SIMETHICONE 125 MG: 125 TABLET, CHEWABLE ORAL at 21:42

## 2024-01-23 RX ADMIN — HYDROCODONE BITARTRATE AND ACETAMINOPHEN 1 TABLET: 10; 325 TABLET ORAL at 20:19

## 2024-01-23 RX ADMIN — GABAPENTIN 200 MG: 100 CAPSULE ORAL at 21:52

## 2024-01-23 RX ADMIN — SODIUM CHLORIDE, PRESERVATIVE FREE 2 ML: 5 INJECTION INTRAVENOUS at 09:06

## 2024-01-23 RX ADMIN — POLYETHYLENE GLYCOL (3350) 17 G: 17 POWDER, FOR SOLUTION ORAL at 09:06

## 2024-01-23 RX ADMIN — ONDANSETRON 4 MG: 2 INJECTION INTRAMUSCULAR; INTRAVENOUS at 14:28

## 2024-01-23 RX ADMIN — SODIUM CHLORIDE, POTASSIUM CHLORIDE, SODIUM LACTATE AND CALCIUM CHLORIDE: 600; 310; 30; 20 INJECTION, SOLUTION INTRAVENOUS at 02:56

## 2024-01-23 RX ADMIN — HYDROCODONE BITARTRATE AND ACETAMINOPHEN 1 TABLET: 10; 325 TABLET ORAL at 06:31

## 2024-01-23 SDOH — ECONOMIC STABILITY: HOUSING INSECURITY: WHAT IS YOUR LIVING SITUATION TODAY?: I HAVE A STEADY PLACE TO LIVE

## 2024-01-23 ASSESSMENT — COGNITIVE AND FUNCTIONAL STATUS - GENERAL
DO YOU HAVE SERIOUS DIFFICULTY WALKING OR CLIMBING STAIRS: NO
BECAUSE OF A PHYSICAL, MENTAL, OR EMOTIONAL CONDITION, DO YOU HAVE DIFFICULTY DOING ERRANDS ALONE: NO
BECAUSE OF A PHYSICAL, MENTAL, OR EMOTIONAL CONDITION, DO YOU HAVE SERIOUS DIFFICULTY CONCENTRATING, REMEMBERING OR MAKING DECISIONS: NO
DO YOU HAVE DIFFICULTY DRESSING OR BATHING: NO

## 2024-01-23 ASSESSMENT — PAIN SCALES - GENERAL
PAINLEVEL_OUTOF10: 1
PAINLEVEL_OUTOF10: 3
PAINLEVEL_OUTOF10: 7
PAINLEVEL_OUTOF10: 5
PAINLEVEL_OUTOF10: 7
PAINLEVEL_OUTOF10: 2

## 2024-01-24 LAB
ASR DISCLAIMER: NORMAL
BASOPHILS # BLD: 0 K/MCL (ref 0–0.3)
BASOPHILS NFR BLD: 0 %
CASE RPRT: NORMAL
CLINICAL INFO: NORMAL
DEPRECATED RDW RBC: 47.9 FL (ref 39–50)
EOSINOPHIL # BLD: 0.1 K/MCL (ref 0–0.5)
EOSINOPHIL NFR BLD: 1 %
ERYTHROCYTE [DISTWIDTH] IN BLOOD: 13.5 % (ref 11–15)
GLUCOSE BLDC GLUCOMTR-MCNC: 122 MG/DL (ref 70–99)
GLUCOSE BLDC GLUCOMTR-MCNC: 141 MG/DL (ref 70–99)
GLUCOSE BLDC GLUCOMTR-MCNC: 148 MG/DL (ref 70–99)
GLUCOSE BLDC GLUCOMTR-MCNC: 176 MG/DL (ref 70–99)
HCT VFR BLD CALC: 36 % (ref 36–46.5)
HGB BLD-MCNC: 11.6 G/DL (ref 12–15.5)
IMM GRANULOCYTES # BLD AUTO: 0.1 K/MCL (ref 0–0.2)
IMM GRANULOCYTES # BLD: 1 %
LYMPHOCYTES # BLD: 4.1 K/MCL (ref 1–4)
LYMPHOCYTES NFR BLD: 32 %
MCH RBC QN AUTO: 31.3 PG (ref 26–34)
MCHC RBC AUTO-ENTMCNC: 32.2 G/DL (ref 32–36.5)
MCV RBC AUTO: 97 FL (ref 78–100)
MONOCYTES # BLD: 1 K/MCL (ref 0.3–0.9)
MONOCYTES NFR BLD: 8 %
NEUTROPHILS # BLD: 7.5 K/MCL (ref 1.8–7.7)
NEUTROPHILS NFR BLD: 58 %
NRBC BLD MANUAL-RTO: 0 /100 WBC
PATH REPORT.FINAL DX SPEC: NORMAL
PATH REPORT.GROSS SPEC: NORMAL
PLATELET # BLD AUTO: 199 K/MCL (ref 140–450)
RAINBOW EXTRA TUBES HOLD SPECIMEN: NORMAL
RBC # BLD: 3.71 MIL/MCL (ref 4–5.2)
WBC # BLD: 12.8 K/MCL (ref 4.2–11)

## 2024-01-24 PROCEDURE — 85025 COMPLETE CBC W/AUTO DIFF WBC: CPT | Performed by: INTERNAL MEDICINE

## 2024-01-24 PROCEDURE — 10004651 HB RX, NO CHARGE ITEM: Performed by: OBSTETRICS & GYNECOLOGY

## 2024-01-24 PROCEDURE — 82962 GLUCOSE BLOOD TEST: CPT

## 2024-01-24 PROCEDURE — 36415 COLL VENOUS BLD VENIPUNCTURE: CPT | Performed by: INTERNAL MEDICINE

## 2024-01-24 PROCEDURE — 10002800 HB RX 250 W HCPCS: Performed by: INTERNAL MEDICINE

## 2024-01-24 PROCEDURE — C9113 INJ PANTOPRAZOLE SODIUM, VIA: HCPCS | Performed by: INTERNAL MEDICINE

## 2024-01-24 PROCEDURE — G0378 HOSPITAL OBSERVATION PER HR: HCPCS

## 2024-01-24 PROCEDURE — 96372 THER/PROPH/DIAG INJ SC/IM: CPT | Performed by: INTERNAL MEDICINE

## 2024-01-24 PROCEDURE — 10002803 HB RX 637: Performed by: INTERNAL MEDICINE

## 2024-01-24 PROCEDURE — 10002803 HB RX 637: Performed by: OBSTETRICS & GYNECOLOGY

## 2024-01-24 RX ORDER — PANTOPRAZOLE SODIUM 40 MG/1
40 TABLET, DELAYED RELEASE ORAL
Status: DISCONTINUED | OUTPATIENT
Start: 2024-01-25 | End: 2024-01-25 | Stop reason: HOSPADM

## 2024-01-24 RX ORDER — PANTOPRAZOLE SODIUM 40 MG/10ML
40 INJECTION, POWDER, LYOPHILIZED, FOR SOLUTION INTRAVENOUS ONCE
Status: COMPLETED | OUTPATIENT
Start: 2024-01-24 | End: 2024-01-24

## 2024-01-24 RX ADMIN — PANTOPRAZOLE SODIUM 40 MG: 40 INJECTION, POWDER, FOR SOLUTION INTRAVENOUS at 11:08

## 2024-01-24 RX ADMIN — SENNOSIDES AND DOCUSATE SODIUM 1 TABLET: 8.6; 5 TABLET ORAL at 09:05

## 2024-01-24 RX ADMIN — INSULIN LISPRO 1 UNITS: 100 INJECTION, SOLUTION INTRAVENOUS; SUBCUTANEOUS at 09:00

## 2024-01-24 RX ADMIN — HYDROCODONE BITARTRATE AND ACETAMINOPHEN 1 TABLET: 5; 325 TABLET ORAL at 03:47

## 2024-01-24 RX ADMIN — HYDROCODONE BITARTRATE AND ACETAMINOPHEN 1 TABLET: 10; 325 TABLET ORAL at 14:47

## 2024-01-24 RX ADMIN — LISINOPRIL 5 MG: 5 TABLET ORAL at 09:05

## 2024-01-24 RX ADMIN — GABAPENTIN 200 MG: 100 CAPSULE ORAL at 06:20

## 2024-01-24 RX ADMIN — POLYETHYLENE GLYCOL (3350) 17 G: 17 POWDER, FOR SOLUTION ORAL at 03:47

## 2024-01-24 RX ADMIN — POLYETHYLENE GLYCOL (3350) 17 G: 17 POWDER, FOR SOLUTION ORAL at 09:05

## 2024-01-24 RX ADMIN — SIMETHICONE 125 MG: 125 TABLET, CHEWABLE ORAL at 04:29

## 2024-01-24 RX ADMIN — GABAPENTIN 200 MG: 100 CAPSULE ORAL at 14:44

## 2024-01-24 RX ADMIN — GABAPENTIN 200 MG: 100 CAPSULE ORAL at 21:55

## 2024-01-24 RX ADMIN — SODIUM CHLORIDE, PRESERVATIVE FREE 2 ML: 5 INJECTION INTRAVENOUS at 09:06

## 2024-01-24 RX ADMIN — HYDROCODONE BITARTRATE AND ACETAMINOPHEN 1 TABLET: 5; 325 TABLET ORAL at 21:55

## 2024-01-24 RX ADMIN — SENNOSIDES AND DOCUSATE SODIUM 1 TABLET: 8.6; 5 TABLET ORAL at 21:55

## 2024-01-24 RX ADMIN — SODIUM CHLORIDE, PRESERVATIVE FREE 2 ML: 5 INJECTION INTRAVENOUS at 21:55

## 2024-01-24 ASSESSMENT — PAIN SCALES - GENERAL
PAINLEVEL_OUTOF10: 3
PAINLEVEL_OUTOF10: 2
PAINLEVEL_OUTOF10: 4

## 2024-01-25 VITALS
RESPIRATION RATE: 16 BRPM | OXYGEN SATURATION: 96 % | TEMPERATURE: 98.6 F | BODY MASS INDEX: 29.86 KG/M2 | HEART RATE: 95 BPM | WEIGHT: 162.26 LBS | HEIGHT: 62 IN | SYSTOLIC BLOOD PRESSURE: 117 MMHG | DIASTOLIC BLOOD PRESSURE: 69 MMHG

## 2024-01-25 LAB
GLUCOSE BLDC GLUCOMTR-MCNC: 136 MG/DL (ref 70–99)
GLUCOSE BLDC GLUCOMTR-MCNC: 144 MG/DL (ref 70–99)
GLUCOSE BLDC GLUCOMTR-MCNC: 168 MG/DL (ref 70–99)

## 2024-01-25 PROCEDURE — 10004651 HB RX, NO CHARGE ITEM: Performed by: OBSTETRICS & GYNECOLOGY

## 2024-01-25 PROCEDURE — 82962 GLUCOSE BLOOD TEST: CPT

## 2024-01-25 PROCEDURE — G0378 HOSPITAL OBSERVATION PER HR: HCPCS

## 2024-01-25 PROCEDURE — 10002803 HB RX 637: Performed by: OBSTETRICS & GYNECOLOGY

## 2024-01-25 PROCEDURE — 10002803 HB RX 637: Performed by: INTERNAL MEDICINE

## 2024-01-25 RX ORDER — PANTOPRAZOLE SODIUM 40 MG/1
40 TABLET, DELAYED RELEASE ORAL
Qty: 14 TABLET | Refills: 0 | Status: SHIPPED | OUTPATIENT
Start: 2024-01-26 | End: 2024-02-09

## 2024-01-25 RX ADMIN — BISACODYL 10 MG: 10 SUPPOSITORY RECTAL at 05:33

## 2024-01-25 RX ADMIN — GABAPENTIN 200 MG: 100 CAPSULE ORAL at 13:54

## 2024-01-25 RX ADMIN — PANTOPRAZOLE SODIUM 40 MG: 40 TABLET, DELAYED RELEASE ORAL at 05:29

## 2024-01-25 RX ADMIN — LISINOPRIL 5 MG: 5 TABLET ORAL at 08:05

## 2024-01-25 RX ADMIN — POLYETHYLENE GLYCOL (3350) 17 G: 17 POWDER, FOR SOLUTION ORAL at 08:05

## 2024-01-25 RX ADMIN — SODIUM CHLORIDE, PRESERVATIVE FREE 2 ML: 5 INJECTION INTRAVENOUS at 08:08

## 2024-01-25 RX ADMIN — GABAPENTIN 200 MG: 100 CAPSULE ORAL at 05:29

## 2024-01-25 RX ADMIN — SENNOSIDES AND DOCUSATE SODIUM 1 TABLET: 8.6; 5 TABLET ORAL at 08:05

## 2024-01-25 RX ADMIN — HYDROCODONE BITARTRATE AND ACETAMINOPHEN 1 TABLET: 5; 325 TABLET ORAL at 08:08

## 2024-01-25 ASSESSMENT — PAIN SCALES - GENERAL
PAINLEVEL_OUTOF10: 3
PAINLEVEL_OUTOF10: 2

## 2024-01-26 LAB
ASR DISCLAIMER: NORMAL
CASE RPRT: NORMAL
CLINICAL INFO: NORMAL
PATH REPORT.FINAL DX SPEC: NORMAL
PATH REPORT.GROSS SPEC: NORMAL
PATH REPORT.INTRAOP OBS SPEC DOC: NORMAL

## 2024-03-08 ENCOUNTER — OFFICE VISIT (OUTPATIENT)
Dept: INTERNAL MEDICINE CLINIC | Facility: CLINIC | Age: 64
End: 2024-03-08
Payer: COMMERCIAL

## 2024-03-08 VITALS
HEART RATE: 76 BPM | BODY MASS INDEX: 29.33 KG/M2 | HEIGHT: 62 IN | OXYGEN SATURATION: 99 % | DIASTOLIC BLOOD PRESSURE: 80 MMHG | WEIGHT: 159.38 LBS | SYSTOLIC BLOOD PRESSURE: 136 MMHG | TEMPERATURE: 98 F

## 2024-03-08 DIAGNOSIS — R10.9 LEFT FLANK PAIN: Primary | ICD-10-CM

## 2024-03-08 DIAGNOSIS — K59.00 CONSTIPATION, UNSPECIFIED CONSTIPATION TYPE: ICD-10-CM

## 2024-03-08 LAB
APPEARANCE: CLEAR
BILIRUBIN: NEGATIVE
GLUCOSE (URINE DIPSTICK): NEGATIVE MG/DL
KETONES (URINE DIPSTICK): NEGATIVE MG/DL
LEUKOCYTES: NEGATIVE
MULTISTIX LOT#: NORMAL NUMERIC
NITRITE, URINE: NEGATIVE
OCCULT BLOOD: NEGATIVE
PH, URINE: 6 (ref 4.5–8)
PROTEIN (URINE DIPSTICK): NEGATIVE MG/DL
SPECIFIC GRAVITY: 1.01 (ref 1–1.03)
URINE-COLOR: YELLOW
UROBILINOGEN,SEMI-QN: 0.2 MG/DL (ref 0–1.9)

## 2024-03-08 RX ORDER — VALACYCLOVIR HYDROCHLORIDE 1 G/1
TABLET, FILM COATED ORAL
Qty: 21 TABLET | Refills: 0 | Status: SHIPPED | OUTPATIENT
Start: 2024-03-08

## 2024-03-09 PROBLEM — K59.00 CONSTIPATION: Status: ACTIVE | Noted: 2024-03-09

## 2024-03-10 NOTE — PATIENT INSTRUCTIONS
Treating Constipation  Constipation is a common and often uncomfortable problem. Constipation means you have bowel movements fewer than 3 times per week. Or that you strain to pass hard, dry stool. It can last a short time. Or it can be a problem that never seems to go away. The good news is that it can often be treated and controlled.   Eat more fiber  One of the best ways to help treat constipation is to increase your fiber intake. You can do this either through diet or by using fiber supplements. Fiber (in whole grains, fruits, and vegetables) adds bulk and absorbs water to soften the stool. This helps the stool pass through the colon more easily. When you increase your fiber intake, do it slowly to prevent side effects such as bloating. Also increase the amount of water that you drink. Eating more of these foods can add fiber to your diet:   High-fiber cereals  Whole grains, bran, and brown rice  Vegetables such as carrots, broccoli, and greens  Fresh fruits (especially apples, pears, and dried fruits such as raisins and apricots)  Nuts and legumes (especially beans such as lentils, kidney beans, and lima beans)  Set a good routine     Go to the bathroom when you feel you need to. Don’t ignore the urge to have a bowel movement.  Set aside time after meals to go to the bathroom.     Get physically active  Exercise helps improve the working of your colon which helps ease constipation. Try to get some physical activity every day. If you haven’t been active for a while, talk with your healthcare provider before starting again.     Consider other choices  Laxatives. Your healthcare provider may suggest an over-the-counter product to help ease your constipation. They may suggest using bulk-forming products or laxatives. Laxatives are common and safe if used as directed. Follow directions carefully when using them. See your provider for new constipation or long-term constipation. This is to rule out other causes such  as certain medicines or other health conditions. See your provider if you have rectal bleeding.  Pelvic floor training. Biofeedback and pelvic physical therapy (PT) may be helpful. They can help if you have pelvic floor problems that may be lead to constipation. For biofeedback, the healthcare provider puts sensors in and outside your anus. This helps you learn how to find and relax the muscles during a bowel movement so you don't get constipated. With PT, you'll learn exercises to help have normal bowel movements and prevent constipation. You may be taught different positions to use to keep from straining during a bowel movement.     [employer] last reviewed this educational content on 5/1/2022  © 3393-0283 [employer],

## 2024-03-15 ENCOUNTER — HOSPITAL ENCOUNTER (OUTPATIENT)
Dept: GENERAL RADIOLOGY | Age: 64
Discharge: HOME | End: 2024-03-15
Payer: COMMERCIAL

## 2024-03-15 DIAGNOSIS — R10.9 LEFT FLANK PAIN: ICD-10-CM

## 2024-03-15 PROCEDURE — 72072 X-RAY EXAM THORAC SPINE 3VWS: CPT

## 2024-03-20 DIAGNOSIS — N85.8 UTERINE MASS: ICD-10-CM

## 2024-03-20 DIAGNOSIS — R10.9 ABDOMINAL PAIN: Primary | ICD-10-CM

## 2024-03-20 DIAGNOSIS — R10.9 FLANK PAIN: ICD-10-CM

## 2024-03-25 ENCOUNTER — HOSPITAL ENCOUNTER (OUTPATIENT)
Dept: CT IMAGING | Age: 64
Discharge: HOME | End: 2024-03-25
Attending: PHYSICIAN ASSISTANT

## 2024-03-25 DIAGNOSIS — N85.8 UTERINE MASS: ICD-10-CM

## 2024-03-25 DIAGNOSIS — R10.9 ABDOMINAL PAIN: ICD-10-CM

## 2024-03-25 DIAGNOSIS — R10.9 FLANK PAIN: ICD-10-CM

## 2024-03-25 PROCEDURE — 74176 CT ABD & PELVIS W/O CONTRAST: CPT

## 2024-04-01 ENCOUNTER — TELEPHONE (OUTPATIENT)
Dept: INTERNAL MEDICINE CLINIC | Facility: CLINIC | Age: 64
End: 2024-04-01

## 2024-04-01 NOTE — TELEPHONE ENCOUNTER
Patient called as she saw NPPreeti for flank pain. NP thought she had Shingles.  No blisters when she saw the NP.    Now she does have blisters and is asking if she can have the medication, Valacyclovir refilled.  She finished the medication.

## 2024-04-04 DIAGNOSIS — R91.1 LUNG NODULE: Primary | ICD-10-CM

## 2024-04-06 ENCOUNTER — OFFICE VISIT (OUTPATIENT)
Dept: INTERNAL MEDICINE CLINIC | Facility: CLINIC | Age: 64
End: 2024-04-06
Payer: COMMERCIAL

## 2024-04-06 VITALS
WEIGHT: 156.38 LBS | HEIGHT: 62 IN | OXYGEN SATURATION: 98 % | SYSTOLIC BLOOD PRESSURE: 130 MMHG | BODY MASS INDEX: 28.78 KG/M2 | HEART RATE: 78 BPM | DIASTOLIC BLOOD PRESSURE: 74 MMHG

## 2024-04-06 DIAGNOSIS — K76.0 FATTY LIVER: ICD-10-CM

## 2024-04-06 DIAGNOSIS — E55.9 VITAMIN D DEFICIENCY: ICD-10-CM

## 2024-04-06 DIAGNOSIS — B02.29 POST HERPETIC NEURALGIA: Primary | ICD-10-CM

## 2024-04-06 DIAGNOSIS — E11.9 DIABETES MELLITUS TYPE 2 IN NONOBESE (HCC): ICD-10-CM

## 2024-04-06 DIAGNOSIS — R10.9 LEFT FLANK PAIN: ICD-10-CM

## 2024-04-06 PROCEDURE — 99214 OFFICE O/P EST MOD 30 MIN: CPT

## 2024-04-06 RX ORDER — GABAPENTIN 100 MG/1
100 CAPSULE ORAL 3 TIMES DAILY
Qty: 90 CAPSULE | Refills: 1 | Status: SHIPPED | OUTPATIENT
Start: 2024-04-06

## 2024-04-06 NOTE — PATIENT INSTRUCTIONS
Take Vitamin D3 at least 50 mcg daily, with meal.  Try capsaicin cream, which is OTC and comes in a few strengths.  See label for instructions.  Chlorpheniramine or doxylamine at bedtime  Strongly encourage more activity or walking at least 10 minutes after meals.  This fosters digestion and promote circulation, and tries to lessen insulin resistance.  When you have a fatty liver, there is decreased efficiency of filtering, depending on the involvement.

## 2024-04-06 NOTE — PROGRESS NOTES
Allison Smart is a 64 year old femalewho presents with   Chief Complaint   Patient presents with    Shingles     Possible shingles started couple weeks ago, look at her bumps (stomach area)      HPI:   Reports 4 week history of burning, prickly discomfort/pain located Left flank.  Patient was given a second week of Valtrex by PCP.  No exposure to new skin/aundry products or chemicals. Has had a lot of physical stressors with surgery in January.  Is having hot flashes and urine leakage.  Has had postop follow-up after BRIAN with [facility].  Pt has returned to work full time, as an RN at [employer].   Requesting a work note, acknowledging clearance to work.     See current outpatient prescriptions on file.   past medical history on file;    Social History:  Tobacco/Alcohol use on file     REVIEW OF SYSTEMS:   GENERAL HEALTH: feels well otherwise; denies fever but not sleeping well since surgery in January, so has similar fatigue; no report of additional recent viral infection/illness, other than previously diagnosed shingles 4 weeks ago.  SKIN: no pruritis, mod tenderness, no open wounds, vesicles, or purulent drainage;    RESPIRATORY:no report of shortness of breath with exertion  GI: denies new abdominal pain and denies heartburn; Hx of fatty liver    EXAM:   /74   Pulse 78   Ht 5' 2\" (1.575 m)   Wt 156 lb 6.4 oz (70.9 kg)   SpO2 98%   BMI 28.61 kg/m²   GENERAL: well developed, well nourished,in no apparent distress  SKIN: Rash/lesion(s): 1 isolated 3 mm red macule with diffuse edges and no vesicle;  located left lateral mid abdomen; no open wounds, pus or drainage; no s/s secondary infection.  Surgical incisions are well-healed without redness and edges are well-approximated  ABD. Non tender with palpation  RESP:non-Labored  EXTREMITIES: no cyanosis or edema    ASSESSMENT AND PLAN:   Allison Smart is a 64 year old female who presents with   Chief Complaint   Patient presents with    Shingles      Possible shingles started couple weeks ago, look at her bumps (stomach area)        ASSESSMENT:  Encounter Diagnoses   Name Primary?    Post herpetic neuralgia Yes    Left flank pain     Fatty liver     Diabetes mellitus type 2 in nonobese (HCC)     Vitamin D deficiency    Shingles- 4 weeks ago      PLAN:  Requested Prescriptions     Signed Prescriptions Disp Refills    gabapentin 100 MG Oral Cap 90 capsule 1     Sig: Take 1 capsule (100 mg total) by mouth 3 (three) times daily. Start with one at night, for a few days. Then increase as discussed, to dose than brings effect   Take Vitamin D3 at least 50 mcg daily, with meal.  Try capsaicin cream, which is OTC and comes in a few strengths.  See label for instructions.  Chlorpheniramine or doxylamine at bedtime  Strongly encourage more activity or walking at least 10 minutes after meals.  This fosters digestion and promote circulation, and tries to lessen insulin resistance.  When you have a fatty liver, there is decreased efficiency of filtering, depending on the involvement.  Work note given per request. Pt is not contagious    Avoid hot showers, as this can aggravate the PHN/tender area. Cool/tepid showers with minimal mild soap. Gently pat area dry.  Cold compresses may help relieve the itching  See attachment for pt education     The patient indicates understanding of these issues and agrees to the plan.

## 2024-04-10 ENCOUNTER — HOSPITAL ENCOUNTER (OUTPATIENT)
Dept: CT IMAGING | Age: 64
Discharge: HOME | End: 2024-04-10
Attending: PHYSICIAN ASSISTANT

## 2024-04-10 DIAGNOSIS — R91.1 LUNG NODULE: ICD-10-CM

## 2024-04-10 PROCEDURE — 71250 CT THORAX DX C-: CPT

## 2024-05-07 PROBLEM — I10 ESSENTIAL (PRIMARY) HYPERTENSION: Status: ACTIVE | Noted: 2024-01-23

## 2024-05-07 PROBLEM — R31.9 HEMATURIA: Status: ACTIVE | Noted: 2024-05-07

## 2024-05-07 PROBLEM — R10.9 ABDOMINAL PAIN: Status: ACTIVE | Noted: 2024-05-07

## 2024-05-07 PROBLEM — R10.2 PELVIC PAIN IN FEMALE: Status: ACTIVE | Noted: 2024-05-07

## 2024-05-07 PROBLEM — E11.9 DIABETES MELLITUS TYPE 2, UNCOMPLICATED (HCC): Status: ACTIVE | Noted: 2024-01-23

## 2024-05-07 PROBLEM — R10.9 FLANK PAIN: Status: ACTIVE | Noted: 2024-05-07

## 2024-05-07 PROBLEM — R91.1 SOLITARY PULMONARY NODULE: Status: ACTIVE | Noted: 2024-05-07

## 2024-05-07 PROBLEM — G89.18 POST-OPERATIVE PAIN: Status: ACTIVE | Noted: 2024-01-22

## 2024-06-04 ENCOUNTER — NURSE TRIAGE (OUTPATIENT)
Dept: INTERNAL MEDICINE CLINIC | Facility: CLINIC | Age: 64
End: 2024-06-04

## 2024-06-04 ENCOUNTER — APPOINTMENT (OUTPATIENT)
Dept: GENERAL RADIOLOGY | Age: 64
End: 2024-06-04
Attending: NURSE PRACTITIONER
Payer: COMMERCIAL

## 2024-06-04 ENCOUNTER — HOSPITAL ENCOUNTER (OUTPATIENT)
Age: 64
Discharge: HOME | End: 2024-06-04
Payer: COMMERCIAL

## 2024-06-04 VITALS
SYSTOLIC BLOOD PRESSURE: 126 MMHG | HEART RATE: 101 BPM | RESPIRATION RATE: 16 BRPM | DIASTOLIC BLOOD PRESSURE: 80 MMHG | TEMPERATURE: 98 F | OXYGEN SATURATION: 98 %

## 2024-06-04 DIAGNOSIS — S20.212A CONTUSION OF RIB ON LEFT SIDE, INITIAL ENCOUNTER: Primary | ICD-10-CM

## 2024-06-04 DIAGNOSIS — M94.0 COSTOCHONDRITIS: ICD-10-CM

## 2024-06-04 PROCEDURE — 71101 X-RAY EXAM UNILAT RIBS/CHEST: CPT | Performed by: NURSE PRACTITIONER

## 2024-06-04 PROCEDURE — 99214 OFFICE O/P EST MOD 30 MIN: CPT

## 2024-06-04 PROCEDURE — 99213 OFFICE O/P EST LOW 20 MIN: CPT

## 2024-06-04 NOTE — TELEPHONE ENCOUNTER
Patient called the office. She is asking for a call back from the Triage nurse.  Needing to triage her symptoms.
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
## 2024-06-04 NOTE — DISCHARGE INSTRUCTIONS
As discussed, chest x-ray is negative for rib fracture.  Likely rib contusion, chest wall strain, costochondritis.  Recommendation is Tylenol every 4 hours and NSAIDs (either Motrin, Advil, Aleve, ibuprofen) every 6 hours.  You may apply ice and/or heat.  You may purchase lidocaine patches over-the-counter, use daily.    Recommendation is to hold pillow to anterior chest when coughing, sneezing, laughing, straining for bowel movement.    Incentive spirometer 10 times an hour while awake.  This is to help exercise your lungs and prevent pneumonia.  Please do not wrap or bind your chest or rib cage as this can lead to pneumonia.    If you have any chest pain, dizziness, lightheadedness, palpitations, shortness of breath, please go to ER.
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
## 2024-06-11 RX ORDER — METFORMIN HYDROCHLORIDE 500 MG/1
TABLET, EXTENDED RELEASE ORAL
Qty: 30 TABLET | Refills: 11 | Status: SHIPPED | OUTPATIENT
Start: 2024-06-11

## 2024-09-12 DIAGNOSIS — I10 ESSENTIAL HYPERTENSION: ICD-10-CM

## 2024-09-12 DIAGNOSIS — M25.562 CHRONIC PAIN OF LEFT KNEE: ICD-10-CM

## 2024-09-12 DIAGNOSIS — G89.29 CHRONIC PAIN OF LEFT KNEE: ICD-10-CM

## 2024-09-12 RX ORDER — LISINOPRIL 2.5 MG/1
2.5 TABLET ORAL DAILY
Qty: 90 TABLET | Refills: 0 | Status: SHIPPED | OUTPATIENT
Start: 2024-09-12

## 2024-11-29 ENCOUNTER — APPOINTMENT (OUTPATIENT)
Dept: URGENT CARE | Age: 64
End: 2024-11-29

## 2024-12-18 DIAGNOSIS — I10 ESSENTIAL HYPERTENSION: ICD-10-CM

## 2024-12-18 DIAGNOSIS — M25.562 CHRONIC PAIN OF LEFT KNEE: ICD-10-CM

## 2024-12-18 DIAGNOSIS — G89.29 CHRONIC PAIN OF LEFT KNEE: ICD-10-CM

## 2024-12-18 RX ORDER — LISINOPRIL 2.5 MG/1
2.5 TABLET ORAL DAILY
Qty: 30 TABLET | Refills: 2 | Status: SHIPPED | OUTPATIENT
Start: 2024-12-18

## 2025-03-26 DIAGNOSIS — K59.00 CONSTIPATION: Primary | ICD-10-CM

## 2025-03-26 DIAGNOSIS — N85.8 UTERINE MASS: ICD-10-CM

## 2025-03-26 DIAGNOSIS — M25.562 CHRONIC PAIN OF LEFT KNEE: ICD-10-CM

## 2025-03-26 DIAGNOSIS — K76.9 LIVER LESION: ICD-10-CM

## 2025-03-26 DIAGNOSIS — G89.29 CHRONIC PAIN OF LEFT KNEE: ICD-10-CM

## 2025-03-26 DIAGNOSIS — I10 ESSENTIAL HYPERTENSION: ICD-10-CM

## 2025-03-26 DIAGNOSIS — R32 URINARY INCONTINENCE: ICD-10-CM

## 2025-03-26 DIAGNOSIS — K76.89 FLOATING LIVER: ICD-10-CM

## 2025-03-26 RX ORDER — LISINOPRIL 2.5 MG/1
2.5 TABLET ORAL DAILY
Qty: 30 TABLET | Refills: 2 | Status: SHIPPED | OUTPATIENT
Start: 2025-03-26

## 2025-04-11 ENCOUNTER — HOSPITAL ENCOUNTER (OUTPATIENT)
Dept: MRI IMAGING | Age: 65
Discharge: HOME | End: 2025-04-11
Attending: PHYSICIAN ASSISTANT

## 2025-04-11 DIAGNOSIS — N85.8 UTERINE MASS: ICD-10-CM

## 2025-04-11 DIAGNOSIS — K76.89 FLOATING LIVER: ICD-10-CM

## 2025-04-11 DIAGNOSIS — K59.00 CONSTIPATION: ICD-10-CM

## 2025-04-11 DIAGNOSIS — K76.9 LIVER LESION: ICD-10-CM

## 2025-04-11 DIAGNOSIS — R32 URINARY INCONTINENCE: ICD-10-CM

## 2025-04-11 PROCEDURE — A9585 GADOBUTROL INJECTION: HCPCS | Performed by: PHYSICIAN ASSISTANT

## 2025-04-11 PROCEDURE — 74183 MRI ABD W/O CNTR FLWD CNTR: CPT

## 2025-04-11 PROCEDURE — 10002805 HB CONTRAST AGENT: Performed by: PHYSICIAN ASSISTANT

## 2025-04-11 RX ORDER — GADOBUTROL 604.72 MG/ML
7.5 INJECTION INTRAVENOUS ONCE
Status: COMPLETED | OUTPATIENT
Start: 2025-04-11 | End: 2025-04-11

## 2025-04-11 RX ADMIN — GADOBUTROL 7.5 ML: 604.72 INJECTION INTRAVENOUS at 19:23

## 2025-04-15 ENCOUNTER — OFFICE VISIT (OUTPATIENT)
Dept: INTERNAL MEDICINE CLINIC | Facility: CLINIC | Age: 65
End: 2025-04-15
Payer: COMMERCIAL

## 2025-04-15 DIAGNOSIS — Z12.31 SCREENING MAMMOGRAM, ENCOUNTER FOR: ICD-10-CM

## 2025-04-15 DIAGNOSIS — I10 ESSENTIAL HYPERTENSION: ICD-10-CM

## 2025-04-15 DIAGNOSIS — Z13.820 OSTEOPOROSIS SCREENING: ICD-10-CM

## 2025-04-15 DIAGNOSIS — B02.29 POST HERPETIC NEURALGIA: ICD-10-CM

## 2025-04-15 DIAGNOSIS — E11.9 DIABETES MELLITUS TYPE 2 IN NONOBESE (HCC): ICD-10-CM

## 2025-04-15 DIAGNOSIS — Z00.00 WELLNESS EXAMINATION: Primary | ICD-10-CM

## 2025-04-15 PROCEDURE — 99396 PREV VISIT EST AGE 40-64: CPT | Performed by: INTERNAL MEDICINE

## 2025-04-15 NOTE — PROGRESS NOTES
Subjective:   Allison Smart is a 65 year old female who presents for Physical     Patient here for a wellness examination and fu on mild dm, htn, and hx of hysterectomy.  Had a colonoscopy.  Has never had a dexa scan  History/Other:    Chief Complaint Reviewed and Verified  No Further Nursing Notes to   Review  Problem List Reviewed         Tobacco:non smoker  She smoked tobacco in the past but quit greater than 12 months ago.  Tobacco Use[1]     Current Medications[2]      Review of Systems:  Review of Systems   Constitutional:  Positive for fatigue and unexpected weight change (10 lb gain).   Respiratory:  Negative for shortness of breath.    Cardiovascular:  Negative for chest pain, palpitations and leg swelling.   Gastrointestinal:         Gerd     Genitourinary: Negative.    Musculoskeletal: Negative.    Neurological: Negative.    Psychiatric/Behavioral: Negative.           Objective:   There were no vitals taken for this visit. Estimated body mass index is 28.61 kg/m² as calculated from the following:    Height as of 4/6/24: 5' 2\" (1.575 m).    Weight as of 4/6/24: 156 lb 6.4 oz (70.9 kg).  Physical Exam  Constitutional:       Appearance: Normal appearance.   HENT:      Head: Normocephalic and atraumatic.      Right Ear: Ear canal normal.      Left Ear: Ear canal normal.   Eyes:      General: No scleral icterus.     Pupils: Pupils are equal, round, and reactive to light.   Neck:      Vascular: No carotid bruit.   Cardiovascular:      Rate and Rhythm: Normal rate and regular rhythm.   Pulmonary:      Effort: Pulmonary effort is normal.      Breath sounds: Normal breath sounds.   Abdominal:      Palpations: Abdomen is soft.      Tenderness: There is no abdominal tenderness.   Genitourinary:     Comments: Breasts dense no dominant mass  Musculoskeletal:      Cervical back: Neck supple.      Right lower leg: No edema.      Left lower leg: No edema.   Skin:     Findings: No lesion.   Neurological:       General: No focal deficit present.      Mental Status: She is alert.   Psychiatric:         Thought Content: Thought content normal.           Assessment & Plan:   1. Wellness examination (Primary) check fasting labs  -     Comp Metabolic Panel (14)  -     Vitamin D, 25-Hydroxy  -     TSH W Reflex To Free T4  -     CBC With Differential With Platelet  2. Essential hypertension controlled to goal  3. Diabetes mellitus type 2 in nonobese (HCC)on semaglutide  -     Hemoglobin A1C  -     Lipid Panel  4. Post herpetic neuralgia resolved  5. Screening mammogram, encounter for referred  -     [facility] RAFAL 2D+3D SCREENING BILAT (CPT=77067/99609); Future; Expected date: 04/15/2025  6. Osteoporosis screening dexa   -     XR DEXA BONE DENSITOMETRY (CPT=77080); Future; Expected date: 04/15/2025        No follow-ups on file.    Haylee Nuñez MD, 4/15/2025, 3:47 PM        [1]   Social History  Tobacco Use   Smoking Status Former    Current packs/day: 0.00    Types: Cigarettes    Quit date: 1995    Years since quittin.3   Smokeless Tobacco Never   Tobacco Comments    quit   [2]   Current Outpatient Medications   Medication Sig Dispense Refill    LISINOPRIL 2.5 MG Oral Tab TAKE 1 TABLET BY MOUTH EVERY DAY 30 tablet 2    METFORMIN  MG Oral Tablet 24 Hr TAKE 1 TABLET (500 MG TOTAL) BY MOUTH DAILY. *MUST USE MAIL ORDER 30 tablet 11    semaglutide 2 MG/3ML Subcutaneous Solution Pen-injector Inject 0.25 mg into the skin once a week. 1 each 11    gabapentin 100 MG Oral Cap Take 1 capsule (100 mg total) by mouth 3 (three) times daily. Start with one at night, for a few days. Then increase as discussed, to dose than brings effect 90 capsule 1    valACYclovir 1 G Oral Tab Take 1 tablet every 8 hours for 7 days 21 tablet 0    ALPRAZolam 0.25 MG Oral Tab Take 1 tablet (0.25 mg total) by mouth every 6 (six) hours as needed. 30 tablet 1    GAVILYTE-G 236 g Oral Recon Soln  (Patient not taking: Reported on 10/26/2023)      SODIUM  FLUORIDE 5000 PPM 1.1 % Dental Paste USE ON A DRY BRUSH. USING A PEA-SIZED AMOUNT, BRUSH ON AM AND PM, NO RINSING FOR 1/2 HR. (Patient not taking: Reported on 4/6/2024)      Omega-3 Fatty Acids (FISH OIL OR) Take by mouth. (Patient not taking: Reported on 4/6/2024)      Ergocalciferol (VITAMIN D OR) Take by mouth.      Cyanocobalamin (VITAMIN B-12 OR) Take by mouth. (Patient not taking: Reported on 4/6/2024)      Multiple Vitamins-Minerals (MULTIVITAMIN ADULT OR) Take by mouth. (Patient not taking: Reported on 4/6/2024)      Acetaminophen (TYLENOL EXTRA STRENGTH) 500 MG Oral Tab Take 1 tablet (500 mg total) by mouth every 6 (six) hours as needed.      Aspirin 325 MG Oral Tab Take 1 tablet (325 mg total) by mouth daily. 1-2 daily prn

## 2025-05-07 DIAGNOSIS — Z12.31 VISIT FOR SCREENING MAMMOGRAM: Primary | ICD-10-CM

## 2025-05-12 ENCOUNTER — HOSPITAL ENCOUNTER (OUTPATIENT)
Dept: MAMMOGRAPHY | Age: 65
Discharge: HOME | End: 2025-05-12
Attending: INTERNAL MEDICINE

## 2025-05-12 DIAGNOSIS — Z12.31 VISIT FOR SCREENING MAMMOGRAM: ICD-10-CM

## 2025-05-12 PROCEDURE — 77067 SCR MAMMO BI INCL CAD: CPT

## 2025-06-05 DIAGNOSIS — M75.111 INCOMPLETE ROTATOR CUFF TEAR OR RUPTURE OF RIGHT SHOULDER, NOT SPECIFIED AS TRAUMATIC: ICD-10-CM

## 2025-06-05 DIAGNOSIS — M75.41 IMPINGEMENT SYNDROME OF RIGHT SHOULDER: Primary | ICD-10-CM

## 2025-06-14 ENCOUNTER — HOSPITAL ENCOUNTER (OUTPATIENT)
Dept: MRI IMAGING | Age: 65
End: 2025-06-14
Attending: FAMILY MEDICINE

## 2025-06-14 DIAGNOSIS — M75.41 IMPINGEMENT SYNDROME OF RIGHT SHOULDER: ICD-10-CM

## 2025-06-14 DIAGNOSIS — M75.111 INCOMPLETE ROTATOR CUFF TEAR OR RUPTURE OF RIGHT SHOULDER, NOT SPECIFIED AS TRAUMATIC: ICD-10-CM

## 2025-06-14 PROCEDURE — 73221 MRI JOINT UPR EXTREM W/O DYE: CPT

## 2025-07-03 RX ORDER — METFORMIN HYDROCHLORIDE 500 MG/1
500 TABLET, EXTENDED RELEASE ORAL
Qty: 90 TABLET | Refills: 3 | Status: SHIPPED | OUTPATIENT
Start: 2025-07-03

## (undated) DEVICE — HEMOSTAT ABS 4X4IN NONWOVEN SURGICEL SNOW

## (undated) DEVICE — SUTURE VICRYL 0 CT1 36IN BRAID COAT ABS VIOL J346H

## (undated) DEVICE — ELECTRODE PT RTN C30- LB 9FT CORD NONIRRITATE NONSENSITIZE

## (undated) DEVICE — TIP ELECTROCAUTERY HOT SHR DA VINCI EWRST 8MM STD CAUT MNPLR

## (undated) DEVICE — CONTAINER SPEC 4OZ 2.5X2.75IN OR POS INDCTR TMPR EVD LEAK

## (undated) DEVICE — SYRINGE 30ML CONC TIP GRAD N-PYRG DEHP-FR STRL MED LF DISP

## (undated) DEVICE — IRRIGATOR SCT STRKFL 2 STRL LF DISP

## (undated) DEVICE — GOWN SURG XL L3 NONREINFORCE SET IN SLV STRL LF DISP BLUE

## (undated) DEVICE — NEEDLE HPO 25GA 1.5IN REG WALL REG BVL LL HUB DEHP-FR STRL

## (undated) DEVICE — TRAY SKIN SCRUB 8IN VINYL CTN 6 WING 6 SPNG STK 2 TIP APL

## (undated) DEVICE — GLOVE SURG 7 PROTEXIS LF BLUE PF SMTH BEAD CUFF INTLK STRL

## (undated) DEVICE — DRAPE .75 SHT FNFLD 76X52IN SURG CNVRT STRL LF DISP TIBURON

## (undated) DEVICE — GLOVE SURG 6.5 PROTEXIS LF CRM PF BEAD CUFF STRL PLISPRN

## (undated) DEVICE — TRAY CATH SURESTEP LUBRI-SIL STLK 16FR 350ML FOLEY URMTR

## (undated) DEVICE — SYSTEM IMG CLEARIFY 8X6IN WARM HUB TROCAR WIPE MRFBR DISP

## (undated) DEVICE — NEEDLE INSFL 120MM EPTH PNMPRTN

## (undated) DEVICE — ADHESIVE DRMBND ADV .7ML LIQUID APL MCBL BRR FLXB 2 OCTYL

## (undated) DEVICE — COVER FLXB SEMIRIGID STRL LF DEVON LITEGLOVE LIGHT HNDL

## (undated) DEVICE — GLOVE SURG 8 PROTEXIS LF CRM PF BEAD CUFF STRL PLISPRN 12IN

## (undated) DEVICE — SUTURE MONOCRYL MTPS 4-0 PS2 18IN MONO ABS UNDYED

## (undated) DEVICE — OBTURATOR LAPSCP 8MM WECK VISTA DISP BLDLS STRL LF

## (undated) DEVICE — SET TBG 3 LUM FLTR AIRSEAL STRL LF ACT CHRCL DISP

## (undated) DEVICE — GLOVE SURG 7.5 DRMPRN ULTRA SRFT TECHNOLOGY LF DARK GRN PF

## (undated) DEVICE — DRAPE ARM 21X19X10.5IN EQUIPMENT DA VINCI XI 21LB

## (undated) DEVICE — GLOVE SURG 7.5 PROTEXIS LF BLUE PF SMTH BEAD CUFF INTLK STRL

## (undated) DEVICE — WATER STRL 500ML PLASTIC POUR BTL LF

## (undated) DEVICE — SYRINGE 10ML CNTRL CONC TIP PYROGEN FREE DEHP-FR STRL MED LF

## (undated) DEVICE — SUTURE PROLENE 0 CT1 30IN MONO NABSB BLUE 8424H

## (undated) DEVICE — DEVICE V-LOC 180 12IN 0 GS-21 ABS GRN CLSR

## (undated) DEVICE — Device

## (undated) DEVICE — DRAPE 2 INCS FILM POUCH ISL ADH STRIP 125X83IN 19INX9 38IN

## (undated) DEVICE — STAPLER SKIN 3.9X6.9MM WIDE 35 CNT FX HEAD RCHT STRL LF

## (undated) DEVICE — SOLUTION SURGSCRB 4OZ 4% CHG BTL LIQUID ANTIMICROBIAL

## (undated) DEVICE — GLOVE SURG 7 PROTEXIS LF CRM PF BEAD CUFF STRL PLISPRN 12IN

## (undated) DEVICE — MANIPULATOR UT 37MM VCARE LG CRV 2 CUP LOCK SCR BLN HYST

## (undated) DEVICE — DRAPE CLMN EQUIPMENT DA VINCI XI

## (undated) DEVICE — PORT HAND ACC 120MM 2 WALL CANNULA BLDLS OPTC TIP LOWPRFL

## (undated) DEVICE — TOWEL OR BLU 16X26IN 4PK

## (undated) DEVICE — GLOVE SURG 8 PROTEXIS LF BLUE PF SMTH BEAD CUFF INTLK STRL

## (undated) DEVICE — 2% CHLORHEXIDINE SKIN PREP ORANGE 26ML

## (undated) DEVICE — SUTURE VICRYL 2-0 SH 27IN BRAID COAT ABS UNDYED J417H

## (undated) DEVICE — SEAL CANNULA 5-12MM DISP

## (undated) DEVICE — DEVICE V-LOC 180 9IN 2-0 GS-22 TPR PT ABS GRN CLSR

## (undated) DEVICE — BAG SPEC RTRVL 25.4CM 15MM TISS RTRVL SYS C1550ML INTRO LF

## (undated) DEVICE — GLOVE SURG 7 PROTEXIS LTX YLW PF SMTH BEAD CUFF STRL

## (undated) DEVICE — SPONGE LAPAROTOMY 18X18IN STERILE 5 PK

## (undated) DEVICE — DRAPE PLVC FLUID POUCH ROBOTIC LAPSCP LITH 142X124X30IN SURG

## (undated) DEVICE — GLOVE SURG 7.5 PROTEXIS LF CRM PF SMTH BEAD CUFF STRL

## (undated) DEVICE — SYRINGE 10ML GRAD N-PYRG DEHP-FR PVC FREE STRL MED LF DISP

## (undated) NOTE — LETTER
4/6/2024          To Whom It May Concern:    Allison Smart is currently under my medical care and has not been contagious since I evaluated her on 3/8/24.    Activity is restricted as follows: none.    If you require additional information please contact our office.      Sincerely,    SNEHA Abdul          Document generated by:  SNEHA Abdul